# Patient Record
Sex: FEMALE | Race: WHITE | Employment: FULL TIME | URBAN - METROPOLITAN AREA
[De-identification: names, ages, dates, MRNs, and addresses within clinical notes are randomized per-mention and may not be internally consistent; named-entity substitution may affect disease eponyms.]

---

## 2017-01-13 ENCOUNTER — OFFICE VISIT (OUTPATIENT)
Dept: FAMILY MEDICINE CLINIC | Facility: CLINIC | Age: 42
End: 2017-01-13

## 2017-01-13 VITALS
SYSTOLIC BLOOD PRESSURE: 138 MMHG | DIASTOLIC BLOOD PRESSURE: 84 MMHG | BODY MASS INDEX: 38.31 KG/M2 | HEART RATE: 74 BPM | HEIGHT: 68 IN | WEIGHT: 252.81 LBS | RESPIRATION RATE: 14 BRPM

## 2017-01-13 DIAGNOSIS — E66.01 SEVERE OBESITY (BMI 35.0-39.9): ICD-10-CM

## 2017-01-13 DIAGNOSIS — R94.31 ABNORMAL EKG: Primary | ICD-10-CM

## 2017-01-13 DIAGNOSIS — Z01.89 ENCOUNTER FOR ROUTINE LABORATORY TESTING: ICD-10-CM

## 2017-01-13 PROCEDURE — 99213 OFFICE O/P EST LOW 20 MIN: CPT | Performed by: PHYSICIAN ASSISTANT

## 2017-01-13 RX ORDER — BUPROPION HYDROCHLORIDE 150 MG/1
150 TABLET ORAL DAILY
Qty: 30 TABLET | Refills: 2 | Status: SHIPPED | OUTPATIENT
Start: 2017-01-13 | End: 2017-09-12

## 2017-01-13 NOTE — PROGRESS NOTES
[facility] Internal Medicine Progress Note    CC:  Patient presents with: Follow - Up: f/u on allergies / dry  skin - better. Wants to discuss weight loss management. Pt declines  flu shot.        HPI:   HPI  Obesity  Pt states over the past Constitutional: She is oriented to person, place, and time and well-developed, well-nourished, and in no distress.    HENT:   Right Ear: External ear normal.   Left Ear: External ear normal.   Nose: Nose normal.   Mouth/Throat: Oropharynx is clear and frankie Patient verbalizes understanding.     Problem List:  Patient Active Problem List:     Back pain     Chronic low back pain     Lumbar disc herniation     Severe obesity (BMI 35.0-39.9) ([address]

## 2017-01-13 NOTE — PATIENT INSTRUCTIONS
Thank you for choosing Samaria La PA-C at [place] [address]  To Do: Ned Quan  1. Pt to get stress echo  2. Pt to start medication as prescribed  3.  Pt to follow-up in 1 month    • Please signup for MY CHART, which is electronic access to yo to improve your quality of life.     Referrals, and Radiology Information:    If your insurance requires a referral to a specialist, please allow 5 business days to process your referral request.    If Nayely Forman PA-C orders a CT or MRI, it may take

## 2017-01-19 ENCOUNTER — TELEPHONE (OUTPATIENT)
Dept: FAMILY MEDICINE CLINIC | Facility: CLINIC | Age: 42
End: 2017-01-19

## 2017-01-19 NOTE — TELEPHONE ENCOUNTER
Lizzie,              Per [facility] this test does not meet medical guidelines and peer to peer has been requested. Physician can complete by calling [phone] option 3 [facility]#[id number]. Please advise if physician will complete.              Thanks,       Ladonna Aguilera

## 2017-01-27 ENCOUNTER — TELEPHONE (OUTPATIENT)
Dept: FAMILY MEDICINE CLINIC | Facility: CLINIC | Age: 42
End: 2017-01-27

## 2017-01-27 DIAGNOSIS — R94.31 ABNORMAL EKG: Primary | ICD-10-CM

## 2017-01-27 NOTE — TELEPHONE ENCOUNTER
Message        --- Message -----          From: Janet Ventura          Sent: 1/18/2017  10:32 AM            To: [facility] Clinical Staff       Subject: Peer to Peer                                               Lizzie,              Per [facility] this test

## 2017-03-29 ENCOUNTER — HOSPITAL ENCOUNTER (OUTPATIENT)
Dept: CV DIAGNOSTICS | Age: 42
Discharge: HOME | End: 2017-03-29
Attending: INTERNAL MEDICINE
Payer: COMMERCIAL

## 2017-03-29 DIAGNOSIS — R94.31 ABNORMAL EKG: ICD-10-CM

## 2017-03-29 PROCEDURE — 93018 CV STRESS TEST I&R ONLY: CPT | Performed by: INTERNAL MEDICINE

## 2017-03-29 PROCEDURE — 93017 CV STRESS TEST TRACING ONLY: CPT

## 2017-03-29 NOTE — PROGRESS NOTES
Quick Note:    Stress test normal- can start weight loss meds- tell her to come back for more weight loss visits  ______

## 2017-04-05 ENCOUNTER — TELEPHONE (OUTPATIENT)
Dept: FAMILY MEDICINE CLINIC | Facility: CLINIC | Age: 42
End: 2017-04-05

## 2017-04-05 RX ORDER — PHENTERMINE HYDROCHLORIDE 37.5 MG/1
37.5 TABLET ORAL
Qty: 30 TABLET | Refills: 0 | Status: SHIPPED | OUTPATIENT
Start: 2017-04-05 | End: 2017-09-12

## 2017-04-05 NOTE — TELEPHONE ENCOUNTER
Notes Recorded by Erika Christopher MD on 3/29/2017 at 12:35 PM  Stress test normal- can start weight loss meds- tell her to come back for more weight loss visits    Per OV notes:   Pt to follow-up in 1 month  If stress echo is normal, ok to start phentermine

## 2017-09-12 NOTE — PATIENT INSTRUCTIONS
Thank you for choosing SHAR Rust at [place] [address]  To Do: Dennie Minerva  1. Can start taking allegra (in the AM) and zytrec (in the PM)  2. Start eucerin under right eye  3. vasoline for scalp   4.  Refill given for wellbutrin     Effect treatment even beyond those discussed today.  All therapies have potential risk of harm or side effects or medication interactions.  It is your duty and for your safety to discuss with the pharmacist and our office with questions, and to notify us and stop

## 2017-09-12 NOTE — PROGRESS NOTES
[facility] Internal Medicine Office Note  Chief Complaint:   Patient presents with:  Medication Follow-Up  Allergies    HPI:   This is a 38year old female coming in for  HPI    Refill on wellbutrin (was originally given for weight loss- w/ phent Cardiovascular: Negative for chest pain. Genitourinary: Negative. Musculoskeletal: Negative. Skin:        Dry skin   Neurological: Negative for dizziness and weakness. Psychiatric/Behavioral: Positive for depressed mood.  Negative for suicidal louisa -     BuPROPion HCl ER, XL, 150 MG Oral Tablet 24 Hr; Take 1 tablet (150 mg total) by mouth daily.   1. Refilled, is not taking for weight loss, but doing well w/ anxiety and depression, no si/hi    Eczema, unspecified type  -     Emollient (EUCERIN INTENSI Patient Active Problem List:     Back pain     Chronic low back pain     Lumbar disc herniation     Severe obesity (BMI 35.0-39.9) ([facility])    OFFICE/OUTPT VISIT,EST,LEVL III  Depression Screening (PHQ-2/PHQ-9): Over the LAST 2 WEEKS

## 2017-09-21 ENCOUNTER — OFFICE VISIT (OUTPATIENT)
Dept: OBGYN CLINIC | Facility: CLINIC | Age: 42
End: 2017-09-21

## 2017-09-21 VITALS
HEART RATE: 88 BPM | BODY MASS INDEX: 37.28 KG/M2 | DIASTOLIC BLOOD PRESSURE: 76 MMHG | WEIGHT: 246 LBS | HEIGHT: 68 IN | SYSTOLIC BLOOD PRESSURE: 122 MMHG

## 2017-09-21 DIAGNOSIS — Z12.4 CERVICAL CANCER SCREENING: Primary | ICD-10-CM

## 2017-09-21 DIAGNOSIS — N76.0 VAGINITIS AND VULVOVAGINITIS: ICD-10-CM

## 2017-09-21 PROCEDURE — 87624 HPV HI-RISK TYP POOLED RSLT: CPT | Performed by: NURSE PRACTITIONER

## 2017-09-21 PROCEDURE — 88175 CYTOPATH C/V AUTO FLUID REDO: CPT | Performed by: NURSE PRACTITIONER

## 2017-09-21 PROCEDURE — 87510 GARDNER VAG DNA DIR PROBE: CPT | Performed by: NURSE PRACTITIONER

## 2017-09-21 PROCEDURE — 99213 OFFICE O/P EST LOW 20 MIN: CPT | Performed by: NURSE PRACTITIONER

## 2017-09-21 PROCEDURE — 87660 TRICHOMONAS VAGIN DIR PROBE: CPT | Performed by: NURSE PRACTITIONER

## 2017-09-21 PROCEDURE — 87480 CANDIDA DNA DIR PROBE: CPT | Performed by: NURSE PRACTITIONER

## 2017-09-21 RX ORDER — FLUCONAZOLE 150 MG/1
150 TABLET ORAL ONCE
Qty: 2 TABLET | Refills: 0 | Status: SHIPPED | OUTPATIENT
Start: 2017-09-21 | End: 2017-09-21

## 2017-09-21 NOTE — PROGRESS NOTES
Gyne note     S: patient is a 38year old yo  here for a 2-3 day history of vaginal odor, irritation, and itching. Patient had similar symptoms a few weeks ago, used RX Clindamycin she had left at home. No treatment for current episode.  Patient feel THINPREP PAP WITH HPV REFLEX REQUEST B; Future    2.  Vaginitis and vulvovaginitis    - VAGINITIS/VAGINOSIS, DNA PROBE; Future  RX Diflucan 150 mg 1 pill PO X 1, repeat 48 hours

## 2017-09-26 LAB — HPV I/H RISK 1 DNA SPEC QL NAA+PROBE: NEGATIVE

## 2017-09-26 RX ORDER — METRONIDAZOLE 500 MG/1
500 TABLET ORAL 2 TIMES DAILY WITH MEALS
Qty: 14 TABLET | Refills: 0 | Status: SHIPPED | OUTPATIENT
Start: 2017-09-26 | End: 2018-01-10 | Stop reason: ALTCHOICE

## 2017-09-26 NOTE — PROGRESS NOTES
Call to patient; no answer. Left detailed message on VM per FYI ok with results and medication information. To call with questions/concerns.

## 2018-01-10 ENCOUNTER — OFFICE VISIT (OUTPATIENT)
Dept: FAMILY MEDICINE CLINIC | Facility: CLINIC | Age: 43
End: 2018-01-10

## 2018-01-10 ENCOUNTER — LAB ENCOUNTER (OUTPATIENT)
Dept: LAB | Age: 43
End: 2018-01-10
Attending: FAMILY MEDICINE
Payer: COMMERCIAL

## 2018-01-10 VITALS
TEMPERATURE: 98 F | SYSTOLIC BLOOD PRESSURE: 134 MMHG | BODY MASS INDEX: 38.34 KG/M2 | RESPIRATION RATE: 16 BRPM | HEIGHT: 68 IN | WEIGHT: 253 LBS | HEART RATE: 75 BPM | DIASTOLIC BLOOD PRESSURE: 80 MMHG

## 2018-01-10 DIAGNOSIS — E66.01 SEVERE OBESITY (BMI 35.0-39.9): ICD-10-CM

## 2018-01-10 DIAGNOSIS — R53.83 FATIGUE, UNSPECIFIED TYPE: ICD-10-CM

## 2018-01-10 DIAGNOSIS — Z00.00 LABORATORY EXAMINATION ORDERED AS PART OF A ROUTINE GENERAL MEDICAL EXAMINATION: ICD-10-CM

## 2018-01-10 DIAGNOSIS — R32 URINARY INCONTINENCE, UNSPECIFIED TYPE: ICD-10-CM

## 2018-01-10 DIAGNOSIS — K21.9 GASTROESOPHAGEAL REFLUX DISEASE WITHOUT ESOPHAGITIS: ICD-10-CM

## 2018-01-10 DIAGNOSIS — I83.893 VARICOSE VEINS OF LEG WITH SWELLING, BILATERAL: ICD-10-CM

## 2018-01-10 DIAGNOSIS — J01.00 ACUTE MAXILLARY SINUSITIS, RECURRENCE NOT SPECIFIED: Primary | ICD-10-CM

## 2018-01-10 LAB
25-HYDROXYVITAMIN D (TOTAL): 13.7 NG/ML (ref 30–100)
ALBUMIN SERPL-MCNC: 3.7 G/DL (ref 3.5–4.8)
ALP LIVER SERPL-CCNC: 84 U/L (ref 37–98)
ALT SERPL-CCNC: 39 U/L (ref 14–54)
ANTI-THYROGLOBULIN: 24 U/ML (ref ?–60)
ANTI-THYROPEROXIDASE: <28 U/ML (ref ?–60)
AST SERPL-CCNC: 20 U/L (ref 15–41)
BASOPHILS # BLD AUTO: 0.03 X10(3) UL (ref 0–0.1)
BASOPHILS NFR BLD AUTO: 0.3 %
BILIRUB SERPL-MCNC: 0.4 MG/DL (ref 0.1–2)
BUN BLD-MCNC: 11 MG/DL (ref 8–20)
CALCIUM BLD-MCNC: 9.1 MG/DL (ref 8.3–10.3)
CHLORIDE: 106 MMOL/L (ref 101–111)
CHOLEST SMN-MCNC: 171 MG/DL (ref ?–200)
CO2: 25 MMOL/L (ref 22–32)
CREAT BLD-MCNC: 0.64 MG/DL (ref 0.55–1.02)
EOSINOPHIL # BLD AUTO: 0.28 X10(3) UL (ref 0–0.3)
EOSINOPHIL NFR BLD AUTO: 2.5 %
ERYTHROCYTE [DISTWIDTH] IN BLOOD BY AUTOMATED COUNT: 12.6 % (ref 11.5–16)
EST. AVERAGE GLUCOSE BLD GHB EST-MCNC: 108 MG/DL (ref 68–126)
FREE T4: 1.1 NG/DL (ref 0.9–1.8)
GLUCOSE BLD-MCNC: 71 MG/DL (ref 70–99)
HAV AB SERPL IA-ACNC: 610 PG/ML (ref 193–986)
HBA1C MFR BLD HPLC: 5.4 % (ref ?–5.7)
HCT VFR BLD AUTO: 45.6 % (ref 34–50)
HDLC SERPL-MCNC: 39 MG/DL (ref 45–?)
HDLC SERPL: 4.38 {RATIO} (ref ?–4.44)
HGB BLD-MCNC: 14.5 G/DL (ref 12–16)
IMMATURE GRANULOCYTE COUNT: 0.05 X10(3) UL (ref 0–1)
IMMATURE GRANULOCYTE RATIO %: 0.4 %
LDLC SERPL CALC-MCNC: 101 MG/DL (ref ?–130)
LYMPHOCYTES # BLD AUTO: 1.82 X10(3) UL (ref 0.9–4)
LYMPHOCYTES NFR BLD AUTO: 15.9 %
M PROTEIN MFR SERPL ELPH: 7.6 G/DL (ref 6.1–8.3)
MCH RBC QN AUTO: 28 PG (ref 27–33.2)
MCHC RBC AUTO-ENTMCNC: 31.8 G/DL (ref 31–37)
MCV RBC AUTO: 88.2 FL (ref 81–100)
MONOCYTES # BLD AUTO: 0.58 X10(3) UL (ref 0.1–0.6)
MONOCYTES NFR BLD AUTO: 5.1 %
NEUTROPHIL ABS PRELIM: 8.66 X10 (3) UL (ref 1.3–6.7)
NEUTROPHILS # BLD AUTO: 8.66 X10(3) UL (ref 1.3–6.7)
NEUTROPHILS NFR BLD AUTO: 75.8 %
NONHDLC SERPL-MCNC: 132 MG/DL (ref ?–130)
PLATELET # BLD AUTO: 284 10(3)UL (ref 150–450)
POTASSIUM SERPL-SCNC: 3.8 MMOL/L (ref 3.6–5.1)
RBC # BLD AUTO: 5.17 X10(6)UL (ref 3.8–5.1)
RED CELL DISTRIBUTION WIDTH-SD: 40.7 FL (ref 35.1–46.3)
SODIUM SERPL-SCNC: 140 MMOL/L (ref 136–144)
TRIGL SERPL-MCNC: 157 MG/DL (ref ?–150)
TSI SER-ACNC: 2.53 MIU/ML (ref 0.35–5.5)
VLDLC SERPL CALC-MCNC: 31 MG/DL (ref 5–40)
WBC # BLD AUTO: 11.4 X10(3) UL (ref 4–13)

## 2018-01-10 PROCEDURE — 86800 THYROGLOBULIN ANTIBODY: CPT | Performed by: PHYSICIAN ASSISTANT

## 2018-01-10 PROCEDURE — 86376 MICROSOMAL ANTIBODY EACH: CPT | Performed by: PHYSICIAN ASSISTANT

## 2018-01-10 PROCEDURE — 83036 HEMOGLOBIN GLYCOSYLATED A1C: CPT | Performed by: PHYSICIAN ASSISTANT

## 2018-01-10 PROCEDURE — 80061 LIPID PANEL: CPT | Performed by: PHYSICIAN ASSISTANT

## 2018-01-10 PROCEDURE — 82306 VITAMIN D 25 HYDROXY: CPT | Performed by: PHYSICIAN ASSISTANT

## 2018-01-10 PROCEDURE — 80050 GENERAL HEALTH PANEL: CPT | Performed by: PHYSICIAN ASSISTANT

## 2018-01-10 PROCEDURE — 82607 VITAMIN B-12: CPT | Performed by: PHYSICIAN ASSISTANT

## 2018-01-10 PROCEDURE — 99214 OFFICE O/P EST MOD 30 MIN: CPT | Performed by: PHYSICIAN ASSISTANT

## 2018-01-10 PROCEDURE — 36415 COLL VENOUS BLD VENIPUNCTURE: CPT | Performed by: PHYSICIAN ASSISTANT

## 2018-01-10 PROCEDURE — 84439 ASSAY OF FREE THYROXINE: CPT | Performed by: PHYSICIAN ASSISTANT

## 2018-01-10 RX ORDER — METHYLPREDNISOLONE 4 MG/1
TABLET ORAL
Qty: 1 KIT | Refills: 0 | Status: SHIPPED | OUTPATIENT
Start: 2018-01-10 | End: 2019-06-04

## 2018-01-10 RX ORDER — IPRATROPIUM BROMIDE 21 UG/1
2 SPRAY, METERED NASAL EVERY 12 HOURS
Qty: 1 BOTTLE | Refills: 0 | Status: SHIPPED | OUTPATIENT
Start: 2018-01-10 | End: 2019-06-04

## 2018-01-10 RX ORDER — AMOXICILLIN AND CLAVULANATE POTASSIUM 875; 125 MG/1; MG/1
1 TABLET, FILM COATED ORAL 2 TIMES DAILY
Qty: 20 TABLET | Refills: 0 | Status: SHIPPED | OUTPATIENT
Start: 2018-01-10 | End: 2018-01-20

## 2018-01-10 NOTE — PROGRESS NOTES
[facility] Internal Medicine Progress Note    CC:  Patient presents with:  Cough: x 4 days  Nasal Congestion      HPI:   HPI  Cough, nasal congestion x 4 days  Pt had low grade fever over the weekend and raspy cough  She has been taking ibuprofen lb   BMI 38.47 kg/m²  Body mass index is 38.47 kg/m². Physical Exam   Constitutional: She is oriented to person, place, and time and well-developed, well-nourished, and in no distress.    HENT:   Mouth/Throat: Oropharynx is clear and moist. No oropharyngea Refills for this Visit:  Signed Prescriptions Disp Refills    Amoxicillin-Pot Clavulanate 875-125 MG Oral Tab 20 tablet 0      Sig: Take 1 tablet by mouth 2 (two) times daily.       methylPREDNISolone (MEDROL) 4 MG Oral Tablet Therapy Pack 1 kit 0      Sig:

## 2018-01-10 NOTE — PATIENT INSTRUCTIONS
Thank you for choosing Bhavin Jimenez PA-C at [place] [address]  To Do: Cat Garcia  1. Pt to get lab work done  2. Referrals for specialist  3. Pt to get imaging done  4. Begin medications as prescribed  5.  Follow-up in 6 months, sooner if proble of treatment even beyond those discussed today.  All therapies have potential risk of harm or side effects or medication interactions.  It is your duty and for your safety to discuss with the pharmacist and our office with questions, and to notify us and s

## 2018-01-15 NOTE — PROGRESS NOTES
Cholesterol is a little elevated, work on diet and exercise  Low Vit D, begin 50,000 U weekly for 12 weeks, and then OTC 2,000 U daily  Neutrophil count elevated possibly due to recent infection, lets recheck CBC in 1 month  Other lab work is stable  F/U a

## 2018-01-16 DIAGNOSIS — D72.9 NEUTROPHILIA: Primary | ICD-10-CM

## 2018-01-16 RX ORDER — ERGOCALCIFEROL 1.25 MG/1
50000 CAPSULE ORAL WEEKLY
Qty: 12 CAPSULE | Refills: 0 | Status: SHIPPED | OUTPATIENT
Start: 2018-01-16 | End: 2018-03-07

## 2018-01-17 ENCOUNTER — TELEPHONE (OUTPATIENT)
Dept: SURGERY | Facility: CLINIC | Age: 43
End: 2018-01-17

## 2018-01-17 NOTE — TELEPHONE ENCOUNTER
Left message regarding incoming referral for bariatric surgery consultation.  If patient wishes to proceed, please fax insurance and ID to Mitra at [facility] [phone] for insurance verification

## 2018-02-16 ENCOUNTER — TELEPHONE (OUTPATIENT)
Dept: SURGERY | Facility: CLINIC | Age: 43
End: 2018-02-16

## 2018-02-16 NOTE — TELEPHONE ENCOUNTER
Left message reminder regarding incoming referral for bariatric surgery consultation.  If patient wishes to proceed and not already done so, please fax insurance and ID to Mitra at [facility] [phone]

## 2019-05-20 ENCOUNTER — PATIENT MESSAGE (OUTPATIENT)
Dept: OBGYN CLINIC | Facility: CLINIC | Age: 44
End: 2019-05-20

## 2019-05-20 RX ORDER — METRONIDAZOLE 7.5 MG/G
1 GEL VAGINAL NIGHTLY
Qty: 70 G | Refills: 0 | Status: SHIPPED | OUTPATIENT
Start: 2019-05-20 | End: 2019-05-25

## 2019-05-20 NOTE — TELEPHONE ENCOUNTER
From: Jhonny Harris  To: SHAR Ruth  Sent: 5/20/2019 6:25 AM CDT  Subject: Prescription Question    Leanna Wallace,  I have an appointment scheduled w you for June 4th. I'm having once again PH balance issues so I think. This happens a few times a year.

## 2019-06-04 ENCOUNTER — OFFICE VISIT (OUTPATIENT)
Dept: OBGYN CLINIC | Facility: CLINIC | Age: 44
End: 2019-06-04
Payer: COMMERCIAL

## 2019-06-04 VITALS
HEART RATE: 87 BPM | DIASTOLIC BLOOD PRESSURE: 90 MMHG | BODY MASS INDEX: 37.74 KG/M2 | SYSTOLIC BLOOD PRESSURE: 136 MMHG | HEIGHT: 68 IN | WEIGHT: 249 LBS

## 2019-06-04 DIAGNOSIS — Z12.39 BREAST CANCER SCREENING: ICD-10-CM

## 2019-06-04 DIAGNOSIS — Z01.419 WELL WOMAN EXAM WITH ROUTINE GYNECOLOGICAL EXAM: Primary | ICD-10-CM

## 2019-06-04 DIAGNOSIS — Z12.4 CERVICAL CANCER SCREENING: ICD-10-CM

## 2019-06-04 DIAGNOSIS — N76.0 VAGINITIS AND VULVOVAGINITIS: ICD-10-CM

## 2019-06-04 PROCEDURE — 88175 CYTOPATH C/V AUTO FLUID REDO: CPT | Performed by: NURSE PRACTITIONER

## 2019-06-04 PROCEDURE — 87624 HPV HI-RISK TYP POOLED RSLT: CPT | Performed by: NURSE PRACTITIONER

## 2019-06-04 PROCEDURE — 87510 GARDNER VAG DNA DIR PROBE: CPT | Performed by: NURSE PRACTITIONER

## 2019-06-04 PROCEDURE — 87480 CANDIDA DNA DIR PROBE: CPT | Performed by: NURSE PRACTITIONER

## 2019-06-04 PROCEDURE — 87660 TRICHOMONAS VAGIN DIR PROBE: CPT | Performed by: NURSE PRACTITIONER

## 2019-06-04 PROCEDURE — 99396 PREV VISIT EST AGE 40-64: CPT | Performed by: NURSE PRACTITIONER

## 2019-06-04 NOTE — PROGRESS NOTES
Here for Routine Annual Exam  Menses are absent with Mirena, she thinks next June is when it is due to be exchanged.   She believes she may have some type of vaginal infection again, she notes itching and external irritation  No C/O    ROS: No Cardiac,

## 2019-06-05 ENCOUNTER — TELEPHONE (OUTPATIENT)
Dept: OBGYN CLINIC | Facility: CLINIC | Age: 44
End: 2019-06-05

## 2019-06-06 ENCOUNTER — TELEPHONE (OUTPATIENT)
Dept: OBGYN CLINIC | Facility: CLINIC | Age: 44
End: 2019-06-06

## 2019-06-06 NOTE — PROGRESS NOTES
Patient informed of results. Verbalized understanding. No further questions or concerns. All precautions given.

## 2019-06-06 NOTE — TELEPHONE ENCOUNTER
Pharmacy did not receive script for Triamcinolone Cream 0.1%. Script called into pharmacy. Verbalized understanding. No further questions or concerns.

## 2019-07-15 ENCOUNTER — OFFICE VISIT (OUTPATIENT)
Dept: FAMILY MEDICINE CLINIC | Facility: CLINIC | Age: 44
End: 2019-07-15
Payer: COMMERCIAL

## 2019-07-15 VITALS
DIASTOLIC BLOOD PRESSURE: 98 MMHG | SYSTOLIC BLOOD PRESSURE: 154 MMHG | HEIGHT: 68 IN | BODY MASS INDEX: 37.28 KG/M2 | TEMPERATURE: 98 F | RESPIRATION RATE: 16 BRPM | HEART RATE: 72 BPM | WEIGHT: 246 LBS

## 2019-07-15 DIAGNOSIS — R53.83 FATIGUE, UNSPECIFIED TYPE: ICD-10-CM

## 2019-07-15 DIAGNOSIS — F41.9 ANXIETY: ICD-10-CM

## 2019-07-15 DIAGNOSIS — R03.0 ELEVATED BP WITHOUT DIAGNOSIS OF HYPERTENSION: ICD-10-CM

## 2019-07-15 DIAGNOSIS — E55.9 VITAMIN D DEFICIENCY: ICD-10-CM

## 2019-07-15 DIAGNOSIS — R51.9 NEW ONSET HEADACHE: Primary | ICD-10-CM

## 2019-07-15 PROCEDURE — 99214 OFFICE O/P EST MOD 30 MIN: CPT | Performed by: PHYSICIAN ASSISTANT

## 2019-07-15 RX ORDER — FLUOXETINE 10 MG/1
10 CAPSULE ORAL DAILY
Qty: 90 CAPSULE | Refills: 0 | Status: SHIPPED | OUTPATIENT
Start: 2019-07-15 | End: 2019-10-22

## 2019-07-15 NOTE — PROGRESS NOTES
[facility] Internal Medicine Progress Note    CC:  Patient presents with:  Headache: comes and goes a week at a time. Neck Pain: pinched nerve on the right side. Has been see chiro.   Ear Pain: left ear      HPI:   HPI  Pt is complaining of heada disturbance and suicidal ideas. The patient is nervous/anxious. BP (!) 154/98   Pulse 72   Temp 98.4 °F (36.9 °C) (Oral)   Resp 16   Ht 68\"   Wt 246 lb   LMP 07/08/2019 (Approximate)   BMI 37.40 kg/m²  Body mass index is 37.4 kg/m².   Physical Exa effects of medication    Fatigue, unspecified type  Will check labs    Vitamin d deficiency  Pt not taking any supplement  Will check labs    RTC in 1 month, sooner if problems    Orders Placed This Encounter      CBC W Differential W Platelet [E]      Com

## 2019-07-15 NOTE — PATIENT INSTRUCTIONS
Thank you for choosing Felicity Blackburn PA-C at [place] [address]  To Do: Dennie Minerva  1. Start over-the-counter Flonase, over-the-counter Allegra or Zyrtec  2. Patient to begin Prozac  3. Get lab work done  4. Referral for MRI of brain  5.   Con Lakisha Jacobson PA-C orders a CT or MRI, it may take up to 10 business days to receive approval from your insurance company.      Once our office has called informing you that the insurance company approved your testing, please call Central Scheduling at [phone]

## 2019-07-16 LAB
ABSOLUTE BASOPHILS: 36 CELLS/UL (ref 0–200)
ABSOLUTE EOSINOPHILS: 207 CELLS/UL (ref 15–500)
ABSOLUTE LYMPHOCYTES: 1602 CELLS/UL (ref 850–3900)
ABSOLUTE MONOCYTES: 621 CELLS/UL (ref 200–950)
ABSOLUTE NEUTROPHILS: 6534 CELLS/UL (ref 1500–7800)
ALBUMIN/GLOBULIN RATIO: 1.5 (CALC) (ref 1–2.5)
ALBUMIN: 4.2 G/DL (ref 3.6–5.1)
ALKALINE PHOSPHATASE: 85 U/L (ref 33–115)
ALT: 18 U/L (ref 6–29)
AST: 14 U/L (ref 10–30)
BASOPHILS: 0.4 %
BILIRUBIN, TOTAL: 0.3 MG/DL (ref 0.2–1.2)
BUN: 11 MG/DL (ref 7–25)
CALCIUM: 9.5 MG/DL (ref 8.6–10.2)
CARBON DIOXIDE: 26 MMOL/L (ref 20–32)
CHLORIDE: 103 MMOL/L (ref 98–110)
CHOL/HDLC RATIO: 4.7 (CALC)
CHOLESTEROL, TOTAL: 183 MG/DL
CREATININE: 0.7 MG/DL (ref 0.5–1.1)
EGFR IF AFRICN AM: 124 ML/MIN/1.73M2
EGFR IF NONAFRICN AM: 107 ML/MIN/1.73M2
EOSINOPHILS: 2.3 %
GLOBULIN: 2.8 G/DL (CALC) (ref 1.9–3.7)
GLUCOSE: 87 MG/DL (ref 65–99)
HDL CHOLESTEROL: 39 MG/DL
HEMATOCRIT: 42.5 % (ref 35–45)
HEMOGLOBIN A1C: 5.2 % OF TOTAL HGB
HEMOGLOBIN: 14 G/DL (ref 11.7–15.5)
LDL-CHOLESTEROL: 113 MG/DL (CALC)
LYMPHOCYTES: 17.8 %
MCH: 27.8 PG (ref 27–33)
MCHC: 32.9 G/DL (ref 32–36)
MCV: 84.3 FL (ref 80–100)
MONOCYTES: 6.9 %
MPV: 11.3 FL (ref 7.5–12.5)
NEUTROPHILS: 72.6 %
NON-HDL CHOLESTEROL: 144 MG/DL (CALC)
PLATELET COUNT: 308 THOUSAND/UL (ref 140–400)
POTASSIUM: 4.4 MMOL/L (ref 3.5–5.3)
PROTEIN, TOTAL: 7 G/DL (ref 6.1–8.1)
RDW: 12.3 % (ref 11–15)
RED BLOOD CELL COUNT: 5.04 MILLION/UL (ref 3.8–5.1)
SODIUM: 137 MMOL/L (ref 135–146)
T4, FREE: 0.9 NG/DL (ref 0.8–1.8)
TRIGLYCERIDES: 191 MG/DL
TSH: 2.72 MIU/L
VITAMIN B12: 469 PG/ML (ref 200–1100)
VITAMIN D, 25-OH, TOTAL: 24 NG/ML (ref 30–100)
WHITE BLOOD CELL COUNT: 9 THOUSAND/UL (ref 3.8–10.8)

## 2019-07-22 ENCOUNTER — TELEPHONE (OUTPATIENT)
Dept: FAMILY MEDICINE CLINIC | Facility: CLINIC | Age: 44
End: 2019-07-22

## 2019-07-22 RX ORDER — ERGOCALCIFEROL 1.25 MG/1
50000 CAPSULE ORAL WEEKLY
Qty: 12 CAPSULE | Refills: 0 | Status: SHIPPED | OUTPATIENT
Start: 2019-07-22 | End: 2019-08-21

## 2019-07-22 NOTE — TELEPHONE ENCOUNTER
Denial   Received: Today   Message Contents   Spero Primer [facility] Clinical Staff             Good Morning,     Per Tammy Carlson this test does not meet medical necessity and has been denied.  Per their guidelines:     Based on [facility] [place]

## 2019-07-22 NOTE — PROGRESS NOTES
Cholesterol is a little elevated, work on low carb diet, increase physical activity  Low Vit D, begin 50,000 IU weekly x 12 weeks, and then OTC 2,000 IU daily  Follow-up as directed

## 2019-10-11 RX ORDER — ERGOCALCIFEROL 1.25 MG/1
CAPSULE ORAL
Qty: 12 CAPSULE | Refills: 0 | OUTPATIENT
Start: 2019-10-11

## 2019-10-22 NOTE — TELEPHONE ENCOUNTER
Requested Prescriptions     Pending Prescriptions Disp Refills   • FLUOXETINE HCL 10 MG Oral Cap [Pharmacy Med Name: FLUOXETINE HCL 10 MG CAPSULE] 90 capsule 0     Sig: TAKE 1 CAPSULE BY MOUTH EVERY DAY     Non protocol medication    LOV: 7/15/2019  RTC: 1

## 2019-10-24 RX ORDER — FLUOXETINE 10 MG/1
CAPSULE ORAL
Qty: 30 CAPSULE | Refills: 0 | Status: SHIPPED | OUTPATIENT
Start: 2019-10-24 | End: 2019-11-15

## 2019-11-11 RX ORDER — ERGOCALCIFEROL 1.25 MG/1
CAPSULE ORAL
Qty: 12 CAPSULE | Refills: 0 | OUTPATIENT
Start: 2019-11-11

## 2019-11-15 RX ORDER — FLUOXETINE 10 MG/1
CAPSULE ORAL
Qty: 90 CAPSULE | Refills: 0 | Status: SHIPPED | OUTPATIENT
Start: 2019-11-15 | End: 2021-04-28

## 2019-12-10 RX ORDER — ERGOCALCIFEROL 1.25 MG/1
CAPSULE ORAL
Qty: 12 CAPSULE | Refills: 0 | OUTPATIENT
Start: 2019-12-10

## 2019-12-23 ENCOUNTER — OFFICE VISIT (OUTPATIENT)
Dept: FAMILY MEDICINE CLINIC | Facility: CLINIC | Age: 44
End: 2019-12-23
Payer: COMMERCIAL

## 2019-12-23 ENCOUNTER — HOSPITAL ENCOUNTER (OUTPATIENT)
Dept: MAMMOGRAPHY | Age: 44
Discharge: HOME | End: 2019-12-23
Attending: NURSE PRACTITIONER
Payer: COMMERCIAL

## 2019-12-23 ENCOUNTER — HOSPITAL ENCOUNTER (OUTPATIENT)
Dept: MRI IMAGING | Age: 44
Discharge: HOME | End: 2019-12-23
Attending: PHYSICIAN ASSISTANT
Payer: COMMERCIAL

## 2019-12-23 VITALS
DIASTOLIC BLOOD PRESSURE: 90 MMHG | BODY MASS INDEX: 38.34 KG/M2 | HEIGHT: 68 IN | WEIGHT: 253 LBS | TEMPERATURE: 99 F | HEART RATE: 98 BPM | SYSTOLIC BLOOD PRESSURE: 140 MMHG | OXYGEN SATURATION: 95 % | RESPIRATION RATE: 18 BRPM

## 2019-12-23 DIAGNOSIS — K21.9 GASTROESOPHAGEAL REFLUX DISEASE, ESOPHAGITIS PRESENCE NOT SPECIFIED: ICD-10-CM

## 2019-12-23 DIAGNOSIS — R51.9 NEW ONSET HEADACHE: ICD-10-CM

## 2019-12-23 DIAGNOSIS — I10 ESSENTIAL HYPERTENSION: Primary | ICD-10-CM

## 2019-12-23 DIAGNOSIS — E66.01 SEVERE OBESITY WITH BODY MASS INDEX (BMI) OF 35.0 TO 39.9 WITH SERIOUS COMORBIDITY (HCC): ICD-10-CM

## 2019-12-23 DIAGNOSIS — Z12.39 BREAST CANCER SCREENING: ICD-10-CM

## 2019-12-23 PROCEDURE — 70553 MRI BRAIN STEM W/O & W/DYE: CPT | Performed by: PHYSICIAN ASSISTANT

## 2019-12-23 PROCEDURE — A9575 INJ GADOTERATE MEGLUMI 0.1ML: HCPCS

## 2019-12-23 PROCEDURE — 77067 SCR MAMMO BI INCL CAD: CPT | Performed by: NURSE PRACTITIONER

## 2019-12-23 PROCEDURE — 99214 OFFICE O/P EST MOD 30 MIN: CPT | Performed by: PHYSICIAN ASSISTANT

## 2019-12-23 PROCEDURE — 77063 BREAST TOMOSYNTHESIS BI: CPT | Performed by: NURSE PRACTITIONER

## 2019-12-23 RX ORDER — LISINOPRIL 10 MG/1
10 TABLET ORAL DAILY
Qty: 90 TABLET | Refills: 0 | Status: SHIPPED | OUTPATIENT
Start: 2019-12-23 | End: 2020-04-20

## 2019-12-23 RX ORDER — NICOTINE POLACRILEX 4 MG/1
1 GUM, CHEWING ORAL DAILY
Qty: 90 TABLET | Refills: 1 | Status: SHIPPED | OUTPATIENT
Start: 2019-12-23 | End: 2020-01-22

## 2019-12-23 NOTE — PROGRESS NOTES
[facility] Internal Medicine Progress Note    CC:  Patient presents with:  Blood Pressure: BP check  Imm/Inj: declined flu shot      HPI:   HPI  Pt is here to follow-up on her blood pressure  She states work has been stressful  Her blood pressure distress. HENT:   Mouth/Throat: Oropharynx is clear and moist. No oropharyngeal exudate. Eyes: Pupils are equal, round, and reactive to light. EOM are normal.   Neck: No thyromegaly present.    Cardiovascular: Normal rate, regular rhythm and normal hear Back pain     Chronic low back pain     Lumbar disc herniation     Severe obesity with body mass index (BMI) of 35.0 to 39.9 with serious comorbidity ([address]

## 2019-12-23 NOTE — PATIENT INSTRUCTIONS
Thank you for choosing Pancho Miller PA-C at [place] [address]  To Do: Jorge Kirby  1. Pt to begin medications as prescribed  2. Monitor BP at home  3.  Follow-up in 1 month, sooner if problems    • Please signup for MY CHART, which is electronic approved your testing, please call Central Scheduling at [phone]  Please allow our office 5 business days to contact you regarding any testing results.     Refill policies:   Allow 3 business days for refills; controlled substances may take longer and surfaces  · Replace your running shoes often  · Ease up on your training  · On a track, make sure you run in both directions  · Have an expert check your stance for running and other sporting activities  · Stretch your outer thigh and hamstrings often  If

## 2019-12-24 NOTE — PROGRESS NOTES
Minimal abnormalities in the white matter, can be seen with migraines which is likely the cause. However may be seen with chronic small vessel disease or demyelinating process which is more unlikely.   Let's consult neuro just to make sure it is from [place]

## 2020-01-08 ENCOUNTER — PATIENT MESSAGE (OUTPATIENT)
Dept: FAMILY MEDICINE CLINIC | Facility: CLINIC | Age: 45
End: 2020-01-08

## 2020-01-08 NOTE — TELEPHONE ENCOUNTER
From: Ed Brush  To: Fiorella Galicia PA-C  Sent: 1/8/2020 9:50 AM CST  Subject: Test Results Question    I received a call regarding my MRI results.  She advised me they still wanted me to see a neurologist and she would send me his/her name and numb

## 2020-01-08 NOTE — TELEPHONE ENCOUNTER
Information below provided to patient via mychart  Dr. Radha Sahni  Address: [id number] [place]  Phone: [phone]

## 2020-04-18 ENCOUNTER — TELEPHONE (OUTPATIENT)
Dept: FAMILY MEDICINE CLINIC | Facility: CLINIC | Age: 45
End: 2020-04-18

## 2020-04-20 RX ORDER — LISINOPRIL 10 MG/1
TABLET ORAL
Qty: 30 TABLET | Refills: 0 | Status: SHIPPED | OUTPATIENT
Start: 2020-04-20 | End: 2020-05-04

## 2020-04-20 NOTE — TELEPHONE ENCOUNTER
Future Appointments   Date Time Provider Víctor Dana   4/21/2020 11:30 AM Rowdy Del Rosario PA-C [facility] [address]   7/22/2020 12:30 PM SHAR Garza [facility] [address]

## 2020-04-20 NOTE — TELEPHONE ENCOUNTER
Requested Prescriptions     Pending Prescriptions Disp Refills   • LISINOPRIL 10 MG Oral Tab [Pharmacy Med Name: LISINOPRIL 10 MG TABLET] 30 tablet 2     Sig: TAKE 1 TABLET BY MOUTH EVERY DAY     LOV: 12/23/19  RTC: 4 weeks  Last Relevant Labs: 7/15/19  Fi

## 2020-04-21 ENCOUNTER — VIRTUAL PHONE E/M (OUTPATIENT)
Dept: FAMILY MEDICINE CLINIC | Facility: CLINIC | Age: 45
End: 2020-04-21
Payer: COMMERCIAL

## 2020-04-21 DIAGNOSIS — I10 ESSENTIAL HYPERTENSION: Primary | ICD-10-CM

## 2020-04-21 DIAGNOSIS — E66.01 SEVERE OBESITY WITH BODY MASS INDEX (BMI) OF 35.0 TO 39.9 WITH SERIOUS COMORBIDITY (HCC): ICD-10-CM

## 2020-04-21 PROCEDURE — 99213 OFFICE O/P EST LOW 20 MIN: CPT | Performed by: PHYSICIAN ASSISTANT

## 2020-04-21 NOTE — PROGRESS NOTES
Virtual Telephone Check-In    Timmy Marino verbally consents to a Virtual/Telephone Check-In visit on 04/21/20. Patient understands and accepts financial responsibility for any deductible, co-insurance and/or co-pays associated with this service.     D this visit. There is no height or weight on file to calculate BMI.   Physical Exam  GENERAL: A&Ox3  NEURO: Speaking fluently and in clear sentences  RESPIRATORY: No increased effort or work with breathing    Assessment and Plan:  Essential hypertension  (pr

## 2020-04-21 NOTE — PATIENT INSTRUCTIONS
Thank you for choosing Rani Red PA-C at [place] [address]  To Do: Shay Gramajo  1. Purchase blood pressure cuff and monitor blood pressure  2. Continue exercise and low carb diet  3.  Follow-up around 3 months, sooner if problems    • Please s informing you that the insurance company approved your testing, please call Central Scheduling at [phone]  Please allow our office 5 business days to contact you regarding any testing results.     Refill policies:   Allow 3 business days for refills; c

## 2020-05-04 RX ORDER — LISINOPRIL 10 MG/1
10 TABLET ORAL DAILY
Qty: 90 TABLET | Refills: 1 | Status: SHIPPED | OUTPATIENT
Start: 2020-05-04 | End: 2021-03-03 | Stop reason: SINTOL

## 2020-05-04 NOTE — TELEPHONE ENCOUNTER
Hypertension Medications Protocol Passed5/4 2:31 PM   CMP or BMP in past 12 months    Last serum creatinine< 2.0    Appointment in past 6 or next 3 months     Requesting lisinopril 10 MG   LOV: 4/21/20 (tele visit)  RTC:   Last Relevant Labs: 7/15/19  Kathi

## 2020-07-22 ENCOUNTER — OFFICE VISIT (OUTPATIENT)
Dept: OBGYN CLINIC | Facility: CLINIC | Age: 45
End: 2020-07-22
Payer: COMMERCIAL

## 2020-07-22 VITALS
WEIGHT: 247.38 LBS | BODY MASS INDEX: 37.49 KG/M2 | HEIGHT: 68 IN | TEMPERATURE: 98 F | SYSTOLIC BLOOD PRESSURE: 124 MMHG | DIASTOLIC BLOOD PRESSURE: 76 MMHG

## 2020-07-22 DIAGNOSIS — Z30.433 ENCOUNTER FOR REMOVAL AND REINSERTION OF IUD: Primary | ICD-10-CM

## 2020-07-22 PROCEDURE — 58301 REMOVE INTRAUTERINE DEVICE: CPT | Performed by: NURSE PRACTITIONER

## 2020-07-22 PROCEDURE — 3074F SYST BP LT 130 MM HG: CPT | Performed by: NURSE PRACTITIONER

## 2020-07-22 PROCEDURE — 3008F BODY MASS INDEX DOCD: CPT | Performed by: NURSE PRACTITIONER

## 2020-07-22 PROCEDURE — 58300 INSERT INTRAUTERINE DEVICE: CPT | Performed by: NURSE PRACTITIONER

## 2020-07-22 PROCEDURE — 3078F DIAST BP <80 MM HG: CPT | Performed by: NURSE PRACTITIONER

## 2020-07-22 RX ORDER — OMEPRAZOLE 20 MG/1
20 CAPSULE, DELAYED RELEASE ORAL DAILY
COMMUNITY
Start: 2020-02-23

## 2020-07-22 NOTE — PROGRESS NOTES
Procedure:    S:  The patient was consented for removal of current Mirena IUD and replacement of new Mirena device. Risks and benefits were discussed including infection, uterine perforation, uterine expulsion, PID, ectopic and intrauterine pregnancy.  All

## 2020-08-19 ENCOUNTER — OFFICE VISIT (OUTPATIENT)
Dept: OBGYN CLINIC | Facility: CLINIC | Age: 45
End: 2020-08-19
Payer: COMMERCIAL

## 2020-08-19 ENCOUNTER — OFFICE VISIT (OUTPATIENT)
Dept: FAMILY MEDICINE CLINIC | Facility: CLINIC | Age: 45
End: 2020-08-19
Payer: COMMERCIAL

## 2020-08-19 VITALS
RESPIRATION RATE: 16 BRPM | HEIGHT: 68 IN | WEIGHT: 239 LBS | HEART RATE: 98 BPM | TEMPERATURE: 98 F | BODY MASS INDEX: 36.22 KG/M2 | SYSTOLIC BLOOD PRESSURE: 128 MMHG | DIASTOLIC BLOOD PRESSURE: 88 MMHG | OXYGEN SATURATION: 97 %

## 2020-08-19 VITALS
DIASTOLIC BLOOD PRESSURE: 88 MMHG | BODY MASS INDEX: 36 KG/M2 | WEIGHT: 237.81 LBS | SYSTOLIC BLOOD PRESSURE: 126 MMHG | TEMPERATURE: 98 F

## 2020-08-19 DIAGNOSIS — L30.9 ECZEMA, UNSPECIFIED TYPE: ICD-10-CM

## 2020-08-19 DIAGNOSIS — H10.11 ALLERGIC CONJUNCTIVITIS OF RIGHT EYE: Primary | ICD-10-CM

## 2020-08-19 DIAGNOSIS — J30.2 SEASONAL ALLERGIC RHINITIS, UNSPECIFIED TRIGGER: ICD-10-CM

## 2020-08-19 DIAGNOSIS — Z30.431 IUD CHECK UP: Primary | ICD-10-CM

## 2020-08-19 PROCEDURE — 3079F DIAST BP 80-89 MM HG: CPT | Performed by: NURSE PRACTITIONER

## 2020-08-19 PROCEDURE — 3079F DIAST BP 80-89 MM HG: CPT | Performed by: PHYSICIAN ASSISTANT

## 2020-08-19 PROCEDURE — 3074F SYST BP LT 130 MM HG: CPT | Performed by: NURSE PRACTITIONER

## 2020-08-19 PROCEDURE — 99214 OFFICE O/P EST MOD 30 MIN: CPT | Performed by: PHYSICIAN ASSISTANT

## 2020-08-19 PROCEDURE — 3008F BODY MASS INDEX DOCD: CPT | Performed by: PHYSICIAN ASSISTANT

## 2020-08-19 PROCEDURE — 99213 OFFICE O/P EST LOW 20 MIN: CPT | Performed by: NURSE PRACTITIONER

## 2020-08-19 PROCEDURE — 3074F SYST BP LT 130 MM HG: CPT | Performed by: PHYSICIAN ASSISTANT

## 2020-08-19 RX ORDER — MULTIVIT-MIN/IRON FUM/FOLIC AC 7.5 MG-4
1 TABLET ORAL DAILY
COMMUNITY

## 2020-08-19 RX ORDER — METHYLPREDNISOLONE 4 MG/1
TABLET ORAL
Qty: 1 KIT | Refills: 0 | Status: SHIPPED | OUTPATIENT
Start: 2020-08-19 | End: 2021-01-06 | Stop reason: ALTCHOICE

## 2020-08-19 RX ORDER — IPRATROPIUM BROMIDE 21 UG/1
2 SPRAY, METERED NASAL EVERY 12 HOURS
Qty: 1 BOTTLE | Refills: 0 | Status: SHIPPED | OUTPATIENT
Start: 2020-08-19 | End: 2020-09-10

## 2020-08-19 RX ORDER — TRIAMCINOLONE ACETONIDE 0.25 MG/G
1 CREAM TOPICAL 2 TIMES DAILY
Qty: 1 TUBE | Refills: 0 | Status: SHIPPED | OUTPATIENT
Start: 2020-08-19

## 2020-08-19 RX ORDER — OLOPATADINE HYDROCHLORIDE 2 MG/ML
1 SOLUTION/ DROPS OPHTHALMIC DAILY
Qty: 1 BOTTLE | Refills: 0 | Status: SHIPPED | OUTPATIENT
Start: 2020-08-19 | End: 2020-09-24

## 2020-08-19 NOTE — PATIENT INSTRUCTIONS
Thank you for choosing Jer Mcclelland PA-C at [place] [address]  To Do: Brittany Clancy  1. OTC Allegra in AM, OTC Zyrtec in PM  2. Begin other medications as prescribed  3.  Follow-up if symptoms persist or increase    • Please signup for MY CHART, wh insurance company approved your testing, please call Central Scheduling at [phone]  Please allow our office 5 business days to contact you regarding any testing results.     Refill policies:   Allow 3 business days for refills; controlled substances ma

## 2020-08-19 NOTE — PROGRESS NOTES
Gyne note     S: patient is a 39year old yo  here for a follow up after having her Mirena IUD removed and replaced with a new device. She denies any concerns, abnormal pain or bleeding after replacement. She has no concerns or questions.     Review

## 2020-08-19 NOTE — PROGRESS NOTES
[facility] Internal Medicine Progress Note    CC:  Patient presents with: Allergies: itchy watery right eye. Derm Problem: dry patches of skin on right side of face.       HPI:   HPI  Patient is a 57-year-old female here today complaining of rig Respiratory: Negative for cough and shortness of breath. Cardiovascular: Negative for chest pain. Gastrointestinal: Negative for nausea and vomiting. Skin: Positive for rash. Neurological: Negative for dizziness, light-headedness and headaches. side effects and adverse effects of medication    RTC prn    No orders of the defined types were placed in this encounter.       Meds & Refills for this Visit:  Requested Prescriptions     Signed Prescriptions Disp Refills   • Ipratropium Bromide 0.03 % U[place]

## 2020-08-19 NOTE — PATIENT INSTRUCTIONS
[address]  Phone: [phone] [place] [address] [phone]/ [phone]    [facility]    [address] floor, [place]

## 2020-09-10 RX ORDER — IPRATROPIUM BROMIDE 21 UG/1
SPRAY, METERED NASAL
Qty: 1 BOTTLE | Refills: 0 | Status: SHIPPED | OUTPATIENT
Start: 2020-09-10

## 2020-09-10 NOTE — TELEPHONE ENCOUNTER
Name from pharmacy: IPRATROPIUM 0.03% SPRAY          Will file in chart as: IPRATROPIUM BROMIDE 0.03 % Nasal Solution         Sig: USE 2 SPRAYS IN EACH NOSTRIL 2 TIMES DAILY    Disp:  1 Bottle (Pharmacy requested: 30 spray)    Refills:  0    Start: 9/10/20

## 2021-01-06 ENCOUNTER — OFFICE VISIT (OUTPATIENT)
Dept: FAMILY MEDICINE CLINIC | Facility: CLINIC | Age: 46
End: 2021-01-06
Payer: COMMERCIAL

## 2021-01-06 VITALS
HEART RATE: 74 BPM | DIASTOLIC BLOOD PRESSURE: 80 MMHG | TEMPERATURE: 98 F | WEIGHT: 251 LBS | RESPIRATION RATE: 16 BRPM | BODY MASS INDEX: 38.04 KG/M2 | HEIGHT: 68 IN | OXYGEN SATURATION: 98 % | SYSTOLIC BLOOD PRESSURE: 124 MMHG

## 2021-01-06 DIAGNOSIS — R68.89 SENSITIVITY TO THE COLD: ICD-10-CM

## 2021-01-06 DIAGNOSIS — I10 ESSENTIAL HYPERTENSION: ICD-10-CM

## 2021-01-06 DIAGNOSIS — M79.604 PAIN IN BOTH LOWER EXTREMITIES: ICD-10-CM

## 2021-01-06 DIAGNOSIS — Z12.31 ENCOUNTER FOR SCREENING MAMMOGRAM FOR MALIGNANT NEOPLASM OF BREAST: ICD-10-CM

## 2021-01-06 DIAGNOSIS — E66.01 SEVERE OBESITY WITH BODY MASS INDEX (BMI) OF 35.0 TO 39.9 WITH SERIOUS COMORBIDITY (HCC): ICD-10-CM

## 2021-01-06 DIAGNOSIS — R07.9 CHEST PAIN, UNSPECIFIED TYPE: Primary | ICD-10-CM

## 2021-01-06 DIAGNOSIS — M79.605 PAIN IN BOTH LOWER EXTREMITIES: ICD-10-CM

## 2021-01-06 PROCEDURE — 3074F SYST BP LT 130 MM HG: CPT | Performed by: PHYSICIAN ASSISTANT

## 2021-01-06 PROCEDURE — 3079F DIAST BP 80-89 MM HG: CPT | Performed by: PHYSICIAN ASSISTANT

## 2021-01-06 PROCEDURE — 3008F BODY MASS INDEX DOCD: CPT | Performed by: PHYSICIAN ASSISTANT

## 2021-01-06 PROCEDURE — 99214 OFFICE O/P EST MOD 30 MIN: CPT | Performed by: PHYSICIAN ASSISTANT

## 2021-01-06 RX ORDER — NAPROXEN 500 MG/1
500 TABLET ORAL 2 TIMES DAILY WITH MEALS
Qty: 20 TABLET | Refills: 0 | Status: SHIPPED | OUTPATIENT
Start: 2021-01-06 | End: 2021-04-28 | Stop reason: ALTCHOICE

## 2021-01-06 RX ORDER — CLOBETASOL PROPIONATE 0.5 MG/G
CREAM TOPICAL
COMMUNITY
Start: 2020-11-01

## 2021-01-06 RX ORDER — MONTELUKAST SODIUM 10 MG/1
10 TABLET ORAL NIGHTLY
COMMUNITY
Start: 2021-01-01

## 2021-01-06 NOTE — PATIENT INSTRUCTIONS
Thank you for choosing Jai Montez PA-C at [place] [address]  To Do: Hu Zavala  1. Get imaging and lab work done  2. Heat to area  3.  Follow-up around 1 month    • Please signup for MY CHART, which is electronic access to your record if you h call Central Scheduling at [phone]  Please allow our office 5 business days to contact you regarding any testing results.     Refill policies:   Allow 3 business days for refills; controlled substances may take longer and must be picked up from the off

## 2021-01-06 NOTE — PROGRESS NOTES
[facility] Internal Medicine Progress Note    CC:  Patient presents with: Follow - Up: in december she went in to [id number] [place] [address] and a EKG chest xray and covid test was done but all came back clear.  She was having chest discomfort but that has gotten Multiple Vitamins-Minerals (MULTI-VITAMIN/MINERALS) Oral Tab, Take 1 tablet by mouth daily. , Disp: , Rfl:     •  FEXOFENADINE HCL OR, as needed. , Disp: , Rfl:     •  triamcinolone acetonide 0.025 % External Cream, Apply 1 Application topically 2 (two) keven regular rhythm and normal heart sounds. Exam reveals no gallop and no friction rub. No murmur heard. Pulmonary/Chest: Effort normal and breath sounds normal. No respiratory distress. She has no wheezes. She has no rales. She exhibits no tenderness.    Abdias Fontenot Patient/Caregiver Education: There are no barriers to learning. Medical education done. Outcome: Patient verbalizes understanding.     Problem List:  Patient Active Problem List:     Back pain     Chronic low back pain     Lumbar disc herniation     Severe

## 2021-02-08 ENCOUNTER — PATIENT MESSAGE (OUTPATIENT)
Dept: FAMILY MEDICINE CLINIC | Facility: CLINIC | Age: 46
End: 2021-02-08

## 2021-02-08 NOTE — TELEPHONE ENCOUNTER
From: Ismael Moran  To: Sabra Alvares PA-C  Sent: 2/8/2021 10:08 AM CST  Subject: Visit Follow-up Question    My blood pressure was high over the weekend. I took it myself at home. I assume I put the cuff on correctly.  It was 160/107 then I got it do

## 2021-02-22 ENCOUNTER — OFFICE VISIT (OUTPATIENT)
Dept: FAMILY MEDICINE CLINIC | Facility: CLINIC | Age: 46
End: 2021-02-22
Payer: COMMERCIAL

## 2021-02-22 VITALS
BODY MASS INDEX: 37.13 KG/M2 | OXYGEN SATURATION: 98 % | HEIGHT: 68 IN | SYSTOLIC BLOOD PRESSURE: 128 MMHG | RESPIRATION RATE: 16 BRPM | TEMPERATURE: 97 F | DIASTOLIC BLOOD PRESSURE: 90 MMHG | WEIGHT: 245 LBS | HEART RATE: 96 BPM

## 2021-02-22 DIAGNOSIS — I10 ESSENTIAL HYPERTENSION: Primary | ICD-10-CM

## 2021-02-22 DIAGNOSIS — R07.89 CHEST WALL PAIN: ICD-10-CM

## 2021-02-22 PROCEDURE — 3080F DIAST BP >= 90 MM HG: CPT | Performed by: FAMILY MEDICINE

## 2021-02-22 PROCEDURE — 3008F BODY MASS INDEX DOCD: CPT | Performed by: FAMILY MEDICINE

## 2021-02-22 PROCEDURE — 3074F SYST BP LT 130 MM HG: CPT | Performed by: FAMILY MEDICINE

## 2021-02-22 PROCEDURE — 99214 OFFICE O/P EST MOD 30 MIN: CPT | Performed by: FAMILY MEDICINE

## 2021-02-22 RX ORDER — FEXOFENADINE HCL 180 MG/1
180 TABLET ORAL DAILY PRN
COMMUNITY

## 2021-02-22 NOTE — PATIENT INSTRUCTIONS
Thank you for choosing Tina Ugalde MD at [place] [address]  To Do: Rina Ackerman   1. Please hold lisinopril and see if  symptoms reported will resolve or improve. May take Tylenol as needed for chest wall pain.   Please take omeprazole 20 mg saurabh Eye Disease: Hypertension can damage the very small blood vessels in the retina.   Bleeding from the aorta, the large blood vessel that supplies blood to the abdomen, pelvis, and legs   Heart failure   Poor blood supply to the legs  Erectile Dysfunction potassium (4,700 milligrams (mg)/day), calcium (1,250 mg/day), and magnesium (500 mg/day) and much less sodium (salt) than the typical American diet. Limit sodium to no more than 2,300 mg a day (eating only 1,500 mg a day is an even better goal).    Reduce leafy green vegetables or 1/2 cup cut-up raw or cooked vegetables.    • Don't think of vegetables only as side dishes — a hearty blend of vegetables served over brown rice or whole-wheat noodles can serve as the main dish for a meal.   • Fresh or frozen veg products, choose lactose-free products or consider taking an over-the-counter product that contains the enzyme lactase, which can reduce or prevent the symptoms of lactose intolerance.    • Go easy on regular and even fat-free cheeses because they are typic make a complete protein, just like meat. They also contain isoflavones, a type of natural plant compound (phytochemical) that has been shown to have some health benefits.    Fats and oils (2 to 3 servings a day)  Fat helps your body absorb essential vitamin a more nutritious beverage such as low-fat milk or even plain water. • Cut back on added sugar, which has no nutritional value but can pack on calories. DASH diet: Alcohol and caffeine  Drinking too much alcohol can increase blood pressure.  The DASH di considered healthy — often have lots of sodium. You may not notice a difference in taste when you choose low-sodium food and beverages.  If things seem too bland, gradually introduce low-sodium foods and cut back on table salt until you reach your sodium harm or side effects or medication interactions.  It is your duty and for your safety to discuss with the pharmacist and our office with questions, and to notify us and stop treatment if problems arise, but know that our intention is that the benefits outw

## 2021-02-22 NOTE — PROGRESS NOTES
HPI:    Patient ID: Cat Garcia is a 39year old female. HPI  Ms. Rosalind Cardoza is a pleasant 43-year-old female with known history of hypertension and allergies here today to follow-up for ongoing chest wall pain which she had localized on the mid sternum. nightly. • Clobetasol Propionate 0.05 % External Cream APPLY TOPICALLY TO ECZEMA TWICE DAILY     • naproxen 500 MG Oral Tab Take 1 tablet (500 mg total) by mouth 2 (two) times daily with meals.  20 tablet 0   • Olopatadine HCl 0.2 % Ophthalmic Solutio however I had asked her to hold her lisinopril due to the fact that she has had chronic dry cough since last year. Monitor blood pressure at this point. I asked her to give us an update next week on how she is doing.   Chest wall pain  -May take Tylenol a

## 2021-03-03 ENCOUNTER — PATIENT MESSAGE (OUTPATIENT)
Dept: FAMILY MEDICINE CLINIC | Facility: CLINIC | Age: 46
End: 2021-03-03

## 2021-03-03 RX ORDER — HYDROCHLOROTHIAZIDE 25 MG/1
25 TABLET ORAL DAILY
Qty: 30 TABLET | Refills: 0 | Status: SHIPPED | OUTPATIENT
Start: 2021-03-03 | End: 2021-03-25

## 2021-03-03 NOTE — TELEPHONE ENCOUNTER
Please include lisinopril on the side effect profile    Start hydrochlorothiazide 25mg daily. F/U in 4 weeks    Thanks for the update.

## 2021-03-03 NOTE — TELEPHONE ENCOUNTER
From: Jatin Schulz  To: Ross Ramirez MD  Sent: 3/3/2021 11:55 AM CST  Subject: Visit Follow-up Question    Good afternoon Dr Chelsi Laurent    I saw you Monday last week. We decided to discontinue the lisinopril to see if the cough and chest pain subside.  Eileen Regalado

## 2021-03-25 RX ORDER — HYDROCHLOROTHIAZIDE 25 MG/1
TABLET ORAL
Qty: 30 TABLET | Refills: 0 | Status: SHIPPED | OUTPATIENT
Start: 2021-03-25 | End: 2021-04-07

## 2021-04-07 RX ORDER — HYDROCHLOROTHIAZIDE 25 MG/1
25 TABLET ORAL DAILY
Qty: 90 TABLET | Refills: 0 | Status: SHIPPED | OUTPATIENT
Start: 2021-04-07 | End: 2021-04-28

## 2021-04-07 NOTE — TELEPHONE ENCOUNTER
LOV   Essential hypertension  (primary encounter diagnosis)  -Well-controlled; however I had asked her to hold her lisinopril due to the fact that she has had chronic dry cough since last year. Monitor blood pressure at this point.   I asked her to give us

## 2021-04-28 ENCOUNTER — OFFICE VISIT (OUTPATIENT)
Dept: FAMILY MEDICINE CLINIC | Facility: CLINIC | Age: 46
End: 2021-04-28
Payer: COMMERCIAL

## 2021-04-28 VITALS
HEART RATE: 88 BPM | DIASTOLIC BLOOD PRESSURE: 80 MMHG | WEIGHT: 247 LBS | TEMPERATURE: 97 F | RESPIRATION RATE: 16 BRPM | SYSTOLIC BLOOD PRESSURE: 130 MMHG | BODY MASS INDEX: 37.44 KG/M2 | OXYGEN SATURATION: 100 % | HEIGHT: 68 IN

## 2021-04-28 DIAGNOSIS — R07.9 CHEST PAIN, UNSPECIFIED TYPE: ICD-10-CM

## 2021-04-28 DIAGNOSIS — I10 ESSENTIAL HYPERTENSION: Primary | ICD-10-CM

## 2021-04-28 PROCEDURE — 99214 OFFICE O/P EST MOD 30 MIN: CPT | Performed by: FAMILY MEDICINE

## 2021-04-28 PROCEDURE — 3075F SYST BP GE 130 - 139MM HG: CPT | Performed by: FAMILY MEDICINE

## 2021-04-28 PROCEDURE — 3079F DIAST BP 80-89 MM HG: CPT | Performed by: FAMILY MEDICINE

## 2021-04-28 PROCEDURE — 3008F BODY MASS INDEX DOCD: CPT | Performed by: FAMILY MEDICINE

## 2021-04-28 RX ORDER — METOPROLOL SUCCINATE 25 MG/1
25 TABLET, EXTENDED RELEASE ORAL DAILY
Qty: 90 TABLET | Refills: 1 | Status: SHIPPED | OUTPATIENT
Start: 2021-04-28 | End: 2021-10-25

## 2021-04-28 NOTE — PATIENT INSTRUCTIONS
Thank you for choosing Anabel Pena MD at [place] [address]  To Do: Jhonny Miss  1. Please have tests done  [facility] is located in [address]. Monday, Tuesday & Friday – 8 a.m. to 4 p.m. Wednesday, Thursday – 7 a.m. to 3 p.m.   The lab potential risks and we strive to make you healthier and to improve your quality of life.     Referrals, and Radiology Information:    If your insurance requires a referral to a specialist, please allow 5 business days to process your referral request.    If disease  · Anxiety and panic disorders  · Nerve compression and inflammation  · Rare miscellaneous problems such as aortic aneurysm (a swelling of the large artery coming out of the heart) or pulmonary embolism (a blood clot in the lungs)  Home care  After

## 2021-04-28 NOTE — PROGRESS NOTES
HPI/Subjective:   Patient ID: Jhonny Harris is a 39year old female. HPI  Ms. Henna Callahan is a pleasant 71-year-old female with history of hypertension, allergies, chronic chest pain here today for follow-up.   I did start her on hydrochlorothiazide to help l 0.03 % Nasal Solution USE 2 SPRAYS IN EACH NOSTRIL 2 TIMES DAILY 1 Bottle 0   • Multiple Vitamins-Minerals (MULTI-VITAMIN/MINERALS) Oral Tab Take 1 tablet by mouth daily.      • triamcinolone acetonide 0.025 % External Cream Apply 1 Application topically 2 Assessment & Plan:   Essential hypertension  (primary encounter diagnosis)  -Stable; will plan trial of metoprolol 25 mg extended release daily; I did ask her to monitor blood pressure and record them for me which are reviewed after 4 weeks when I

## 2021-05-03 NOTE — PROGRESS NOTES
Cholesterol is a little elevated, continue to work on lifestyle changes, low carb diet, increase exercise  Low Vit D, begin 50,000IU weekly x 12 weeks, and then OTC 2,000IU daily, take with food  Iron levels are elevated, is pt taking iron supplement

## 2021-05-05 NOTE — PROGRESS NOTES
Patient was originally scheduled for a dobutamine stress echo from Dr. Nery Crenshaw via epic orders. Dobutamine stress echo order comments utilization of treadmill. Dobutamine order converted to exercise stress echo order.   Central scheduling notified for ne

## 2021-05-06 ENCOUNTER — HOSPITAL ENCOUNTER (OUTPATIENT)
Dept: CV DIAGNOSTICS | Facility: HOSPITAL | Age: 46
Discharge: HOME | End: 2021-05-06
Attending: FAMILY MEDICINE
Payer: COMMERCIAL

## 2021-05-06 ENCOUNTER — APPOINTMENT (OUTPATIENT)
Dept: GENERAL RADIOLOGY | Facility: HOSPITAL | Age: 46
End: 2021-05-06
Attending: FAMILY MEDICINE
Payer: COMMERCIAL

## 2021-05-17 ENCOUNTER — LAB ENCOUNTER (OUTPATIENT)
Dept: LAB | Age: 46
End: 2021-05-17
Attending: FAMILY MEDICINE
Payer: COMMERCIAL

## 2021-05-17 DIAGNOSIS — Z01.818 PRE-PROCEDURAL EXAMINATION: ICD-10-CM

## 2021-05-17 DIAGNOSIS — Z11.59 SCREENING FOR VIRAL DISEASE: ICD-10-CM

## 2021-05-20 ENCOUNTER — HOSPITAL ENCOUNTER (OUTPATIENT)
Dept: CV DIAGNOSTICS | Facility: HOSPITAL | Age: 46
Discharge: HOME | End: 2021-05-20
Attending: FAMILY MEDICINE
Payer: COMMERCIAL

## 2021-05-20 ENCOUNTER — HOSPITAL ENCOUNTER (OUTPATIENT)
Dept: GENERAL RADIOLOGY | Facility: HOSPITAL | Age: 46
Discharge: HOME | End: 2021-05-20
Attending: FAMILY MEDICINE
Payer: COMMERCIAL

## 2021-05-20 ENCOUNTER — HOSPITAL ENCOUNTER (OUTPATIENT)
Dept: ULTRASOUND IMAGING | Facility: HOSPITAL | Age: 46
Discharge: HOME | End: 2021-05-20
Attending: PHYSICIAN ASSISTANT
Payer: COMMERCIAL

## 2021-05-20 DIAGNOSIS — R68.89 SENSITIVITY TO THE COLD: ICD-10-CM

## 2021-05-20 DIAGNOSIS — R07.9 CHEST PAIN, UNSPECIFIED TYPE: ICD-10-CM

## 2021-05-20 DIAGNOSIS — M79.605 PAIN IN BOTH LOWER EXTREMITIES: ICD-10-CM

## 2021-05-20 DIAGNOSIS — M79.604 PAIN IN BOTH LOWER EXTREMITIES: ICD-10-CM

## 2021-05-20 PROCEDURE — 93970 EXTREMITY STUDY: CPT | Performed by: PHYSICIAN ASSISTANT

## 2021-05-20 PROCEDURE — 93350 STRESS TTE ONLY: CPT | Performed by: FAMILY MEDICINE

## 2021-05-20 PROCEDURE — 71045 X-RAY EXAM CHEST 1 VIEW: CPT | Performed by: FAMILY MEDICINE

## 2021-05-20 PROCEDURE — 93018 CV STRESS TEST I&R ONLY: CPT | Performed by: FAMILY MEDICINE

## 2021-05-20 PROCEDURE — 93017 CV STRESS TEST TRACING ONLY: CPT | Performed by: FAMILY MEDICINE

## 2021-05-20 NOTE — PROGRESS NOTES
Imaging report reviewed and shows no concerning findings.  Please notify patient.    -Dr. Lorenzo Contreras

## 2021-05-27 ENCOUNTER — OFFICE VISIT (OUTPATIENT)
Dept: FAMILY MEDICINE CLINIC | Facility: CLINIC | Age: 46
End: 2021-05-27
Payer: COMMERCIAL

## 2021-05-27 VITALS
WEIGHT: 247 LBS | HEART RATE: 84 BPM | DIASTOLIC BLOOD PRESSURE: 80 MMHG | TEMPERATURE: 98 F | BODY MASS INDEX: 37.44 KG/M2 | SYSTOLIC BLOOD PRESSURE: 120 MMHG | RESPIRATION RATE: 16 BRPM | OXYGEN SATURATION: 99 % | HEIGHT: 68 IN

## 2021-05-27 DIAGNOSIS — I10 ESSENTIAL HYPERTENSION: Primary | ICD-10-CM

## 2021-05-27 DIAGNOSIS — R07.89 CHEST WALL PAIN: ICD-10-CM

## 2021-05-27 PROCEDURE — 99214 OFFICE O/P EST MOD 30 MIN: CPT | Performed by: FAMILY MEDICINE

## 2021-05-27 PROCEDURE — 3074F SYST BP LT 130 MM HG: CPT | Performed by: FAMILY MEDICINE

## 2021-05-27 PROCEDURE — 3008F BODY MASS INDEX DOCD: CPT | Performed by: FAMILY MEDICINE

## 2021-05-27 PROCEDURE — 3079F DIAST BP 80-89 MM HG: CPT | Performed by: FAMILY MEDICINE

## 2021-05-27 RX ORDER — MELOXICAM 7.5 MG/1
7.5 TABLET ORAL DAILY
Qty: 90 TABLET | Refills: 1 | Status: SHIPPED | OUTPATIENT
Start: 2021-05-27 | End: 2022-01-23

## 2021-05-27 NOTE — PATIENT INSTRUCTIONS
Thank you for choosing Meliza Adame MD at [place] [address]  To Do: Torri Olson  1. High Blood pressure  What Is a Normal Blood Pressure?   The Joint National Committee on Prevention, Detection, Evaluation, and Treatment of High Blood Pressure has pelvis, and legs   Heart failure   Poor blood supply to the legs  Erectile Dysfunction  Problems with your vision    Overview  \"Blood pressure\" is the force of blood pushing against the walls of the arteries as the heart pumps blood.  If this pressure ris no more than 2,300 mg a day (eating only 1,500 mg a day is an even better goal). Reduce saturated fat to no more than 6% of daily calories and total fat to 27% of daily calories.  Low-fat dairy products appear to be especially beneficial for lowering syst rice or whole-wheat noodles can serve as the main dish for a meal.   • Fresh or frozen vegetables are both good choices. When buying frozen and canned vegetables, choose those labeled as low sodium or without added salt.    • To increase the number of servi lactose intolerance. • Go easy on regular and even fat-free cheeses because they are typically high in sodium.    Lean meat, poultry and fish (6 or fewer servings a day)  Meat can be a rich source of protein, B vitamins, iron and zinc. But because even le benefits. Fats and oils (2 to 3 servings a day)  Fat helps your body absorb essential vitamins and helps your body's immune system. But too much fat increases your risk of heart disease, diabetes and obesity.  The DASH diet strives for a healthy balance b DASH diet: Alcohol and caffeine  Drinking too much alcohol can increase blood pressure. The DASH diet recommends that men limit alcohol to two or fewer drinks a day and women one or less. The DASH diet doesn't address caffeine consumption.  The influenc bland, gradually introduce low-sodium foods and cut back on table salt until you reach your sodium goal. That'll give your palate time to adjust. It can take several weeks for your taste buds to get used to less salty foods.      [facility] is loc notify us and stop treatment if problems arise, but know that our intention is that the benefits outweigh those potential risks and we strive to make you healthier and to improve your quality of life.     Referrals, and Radiology Information:    If your ins

## 2021-10-25 RX ORDER — METOPROLOL SUCCINATE 25 MG/1
TABLET, EXTENDED RELEASE ORAL
Qty: 30 TABLET | Refills: 5 | Status: SHIPPED | OUTPATIENT
Start: 2021-10-25

## 2022-01-23 RX ORDER — MELOXICAM 7.5 MG/1
TABLET ORAL
Qty: 30 TABLET | Refills: 5 | Status: SHIPPED | OUTPATIENT
Start: 2022-01-23

## 2022-08-09 RX ORDER — METOPROLOL SUCCINATE 25 MG/1
25 TABLET, EXTENDED RELEASE ORAL DAILY
Qty: 30 TABLET | Refills: 5 | Status: SHIPPED | OUTPATIENT
Start: 2022-08-09

## 2022-10-03 ENCOUNTER — APPOINTMENT (OUTPATIENT)
Dept: URBAN - METROPOLITAN AREA CLINIC 247 | Age: 47
Setting detail: DERMATOLOGY
End: 2022-10-03

## 2022-10-03 DIAGNOSIS — B07.8 OTHER VIRAL WARTS: ICD-10-CM

## 2022-10-03 DIAGNOSIS — L20.89 OTHER ATOPIC DERMATITIS: ICD-10-CM

## 2022-10-03 PROBLEM — L20.84 INTRINSIC (ALLERGIC) ECZEMA: Status: ACTIVE | Noted: 2022-10-03

## 2022-10-03 PROCEDURE — 99203 OFFICE O/P NEW LOW 30 MIN: CPT | Mod: 25

## 2022-10-03 PROCEDURE — OTHER COUNSELING: OTHER

## 2022-10-03 PROCEDURE — 17110 DESTRUCT B9 LESION 1-14: CPT

## 2022-10-03 PROCEDURE — OTHER LIQUID NITROGEN: OTHER

## 2022-10-03 PROCEDURE — OTHER PRESCRIPTION: OTHER

## 2022-10-03 PROCEDURE — OTHER COUNSELING: TOPICAL STEROIDS: OTHER

## 2022-10-03 PROCEDURE — OTHER PRESCRIPTION MEDICATION MANAGEMENT: OTHER

## 2022-10-03 RX ORDER — BETAMETHASONE DIPROPIONATE 0.5 MG/ML
LOTION TOPICAL
Qty: 60 | Refills: 3 | Status: ERX | COMMUNITY
Start: 2022-10-03

## 2022-10-03 RX ORDER — BETAMETHASONE DIPROPIONATE 0.5 MG/G
OINTMENT TOPICAL
Qty: 45 | Refills: 3 | Status: ERX | COMMUNITY
Start: 2022-10-03

## 2022-10-03 ASSESSMENT — LOCATION DETAILED DESCRIPTION DERM
LOCATION DETAILED: LEFT SUPERIOR LATERAL BUCCAL CHEEK
LOCATION DETAILED: RIGHT CENTRAL MALAR CHEEK
LOCATION DETAILED: LEFT MEDIAL MALAR CHEEK
LOCATION DETAILED: RIGHT INFERIOR CENTRAL MALAR CHEEK
LOCATION DETAILED: LEFT CENTRAL MALAR CHEEK
LOCATION DETAILED: LEFT INFERIOR CENTRAL MALAR CHEEK
LOCATION DETAILED: LEFT INFERIOR LATERAL MALAR CHEEK
LOCATION DETAILED: LEFT SUPERIOR PARIETAL SCALP

## 2022-10-03 ASSESSMENT — LOCATION SIMPLE DESCRIPTION DERM
LOCATION SIMPLE: SCALP
LOCATION SIMPLE: LEFT CHEEK
LOCATION SIMPLE: RIGHT CHEEK

## 2022-10-03 ASSESSMENT — LOCATION ZONE DERM
LOCATION ZONE: FACE
LOCATION ZONE: SCALP

## 2022-10-03 NOTE — PROCEDURE: PRESCRIPTION MEDICATION MANAGEMENT
Initiate Treatment: Betamethasone lotion to aa bid PRN flare\\nBetamethasone ointment to aa bid PRN flare
Detail Level: Zone
Render In Strict Bullet Format?: No

## 2022-10-05 ENCOUNTER — OFFICE VISIT (OUTPATIENT)
Dept: FAMILY MEDICINE CLINIC | Facility: CLINIC | Age: 47
End: 2022-10-05
Payer: COMMERCIAL

## 2022-10-05 ENCOUNTER — HOSPITAL ENCOUNTER (OUTPATIENT)
Dept: MAMMOGRAPHY | Age: 47
Discharge: HOME | End: 2022-10-05
Attending: NURSE PRACTITIONER
Payer: COMMERCIAL

## 2022-10-05 ENCOUNTER — HOSPITAL ENCOUNTER (OUTPATIENT)
Dept: GENERAL RADIOLOGY | Age: 47
Discharge: HOME | End: 2022-10-05
Attending: FAMILY MEDICINE
Payer: COMMERCIAL

## 2022-10-05 ENCOUNTER — OFFICE VISIT (OUTPATIENT)
Dept: OBGYN CLINIC | Facility: CLINIC | Age: 47
End: 2022-10-05
Payer: COMMERCIAL

## 2022-10-05 VITALS
HEART RATE: 78 BPM | WEIGHT: 262.38 LBS | SYSTOLIC BLOOD PRESSURE: 120 MMHG | TEMPERATURE: 98 F | BODY MASS INDEX: 39.76 KG/M2 | RESPIRATION RATE: 16 BRPM | DIASTOLIC BLOOD PRESSURE: 80 MMHG | HEIGHT: 68 IN

## 2022-10-05 VITALS
SYSTOLIC BLOOD PRESSURE: 120 MMHG | HEART RATE: 78 BPM | DIASTOLIC BLOOD PRESSURE: 80 MMHG | BODY MASS INDEX: 40 KG/M2 | WEIGHT: 262.25 LBS

## 2022-10-05 DIAGNOSIS — Z01.419 WELL WOMAN EXAM WITH ROUTINE GYNECOLOGICAL EXAM: Primary | ICD-10-CM

## 2022-10-05 DIAGNOSIS — I10 ESSENTIAL HYPERTENSION: ICD-10-CM

## 2022-10-05 DIAGNOSIS — Z00.00 ROUTINE GENERAL MEDICAL EXAMINATION AT A HEALTH CARE FACILITY: ICD-10-CM

## 2022-10-05 DIAGNOSIS — R10.2 PELVIC PAIN: ICD-10-CM

## 2022-10-05 DIAGNOSIS — Z12.4 CERVICAL CANCER SCREENING: ICD-10-CM

## 2022-10-05 DIAGNOSIS — M79.672 LEFT FOOT PAIN: ICD-10-CM

## 2022-10-05 DIAGNOSIS — M79.645 PAIN IN FINGER OF LEFT HAND: ICD-10-CM

## 2022-10-05 DIAGNOSIS — Z00.00 WELLNESS EXAMINATION: Primary | ICD-10-CM

## 2022-10-05 DIAGNOSIS — Z12.31 ENCOUNTER FOR SCREENING MAMMOGRAM FOR MALIGNANT NEOPLASM OF BREAST: ICD-10-CM

## 2022-10-05 DIAGNOSIS — E55.9 VITAMIN D DEFICIENCY: ICD-10-CM

## 2022-10-05 PROCEDURE — 73630 X-RAY EXAM OF FOOT: CPT | Performed by: FAMILY MEDICINE

## 2022-10-05 PROCEDURE — 99396 PREV VISIT EST AGE 40-64: CPT | Performed by: FAMILY MEDICINE

## 2022-10-05 PROCEDURE — 3008F BODY MASS INDEX DOCD: CPT | Performed by: FAMILY MEDICINE

## 2022-10-05 PROCEDURE — 3074F SYST BP LT 130 MM HG: CPT | Performed by: NURSE PRACTITIONER

## 2022-10-05 PROCEDURE — 73140 X-RAY EXAM OF FINGER(S): CPT | Performed by: FAMILY MEDICINE

## 2022-10-05 PROCEDURE — 99396 PREV VISIT EST AGE 40-64: CPT | Performed by: NURSE PRACTITIONER

## 2022-10-05 PROCEDURE — 99213 OFFICE O/P EST LOW 20 MIN: CPT | Performed by: FAMILY MEDICINE

## 2022-10-05 PROCEDURE — 3074F SYST BP LT 130 MM HG: CPT | Performed by: FAMILY MEDICINE

## 2022-10-05 PROCEDURE — 77063 BREAST TOMOSYNTHESIS BI: CPT | Performed by: NURSE PRACTITIONER

## 2022-10-05 PROCEDURE — 3079F DIAST BP 80-89 MM HG: CPT | Performed by: NURSE PRACTITIONER

## 2022-10-05 PROCEDURE — 77067 SCR MAMMO BI INCL CAD: CPT | Performed by: NURSE PRACTITIONER

## 2022-10-05 PROCEDURE — 3079F DIAST BP 80-89 MM HG: CPT | Performed by: FAMILY MEDICINE

## 2022-10-05 RX ORDER — METOPROLOL SUCCINATE 25 MG/1
25 TABLET, EXTENDED RELEASE ORAL DAILY
Qty: 90 TABLET | Refills: 1 | Status: SHIPPED | OUTPATIENT
Start: 2022-10-05

## 2022-10-05 RX ORDER — BETAMETHASONE DIPROPIONATE 0.5 MG/G
LOTION TOPICAL
COMMUNITY
Start: 2022-10-03

## 2022-10-05 NOTE — PROGRESS NOTES
Imaging report reviewed and shows no concerning findings. Please notify patient.   Please try OTC Tylenol as needed for pain  -Dr. Lexie James

## 2022-10-06 LAB
ALBUMIN/GLOBULIN RATIO: 1.4 (CALC) (ref 1–2.5)
ALBUMIN: 4.1 G/DL (ref 3.6–5.1)
ALKALINE PHOSPHATASE: 88 U/L (ref 31–125)
ALT: 19 U/L (ref 6–29)
AST: 14 U/L (ref 10–35)
BILIRUBIN, TOTAL: 0.5 MG/DL (ref 0.2–1.2)
BUN: 8 MG/DL (ref 7–25)
CALCIUM: 9 MG/DL (ref 8.6–10.2)
CARBON DIOXIDE: 25 MMOL/L (ref 20–32)
CHLORIDE: 105 MMOL/L (ref 98–110)
CHOL/HDLC RATIO: 4.6 (CALC)
CHOLESTEROL, TOTAL: 203 MG/DL
CREATININE: 0.59 MG/DL (ref 0.5–0.99)
EGFR: 113 ML/MIN/1.73M2
GLOBULIN: 2.9 G/DL (CALC) (ref 1.9–3.7)
GLUCOSE: 89 MG/DL (ref 65–99)
HDL CHOLESTEROL: 44 MG/DL
HEMATOCRIT: 46.5 % (ref 35–45)
HEMOGLOBIN: 15.1 G/DL (ref 11.7–15.5)
LDL-CHOLESTEROL: 132 MG/DL (CALC)
MCH: 27.6 PG (ref 27–33)
MCHC: 32.5 G/DL (ref 32–36)
MCV: 85 FL (ref 80–100)
MPV: 11.1 FL (ref 7.5–12.5)
NON-HDL CHOLESTEROL: 159 MG/DL (CALC)
PLATELET COUNT: 352 THOUSAND/UL (ref 140–400)
POTASSIUM: 4.2 MMOL/L (ref 3.5–5.3)
PROTEIN, TOTAL: 7 G/DL (ref 6.1–8.1)
RDW: 12.2 % (ref 11–15)
RED BLOOD CELL COUNT: 5.47 MILLION/UL (ref 3.8–5.1)
SODIUM: 137 MMOL/L (ref 135–146)
TRIGLYCERIDES: 152 MG/DL
TSH W/REFLEX TO FT4: 2.56 MIU/L
WHITE BLOOD CELL COUNT: 8.6 THOUSAND/UL (ref 3.8–10.8)

## 2022-10-08 LAB — HPV MRNA E6/E7: NOT DETECTED

## 2022-10-26 LAB — AMB EXT COLOGUARD RESULT: NEGATIVE

## 2022-11-05 ENCOUNTER — TELEPHONE (OUTPATIENT)
Dept: FAMILY MEDICINE CLINIC | Facility: CLINIC | Age: 47
End: 2022-11-05

## 2022-11-05 NOTE — TELEPHONE ENCOUNTER
Received Cologuard results that was completed on 10/26/22. Health maintenance was updated and patient was notified of results. Report sent to scan.

## 2023-03-21 RX ORDER — METOPROLOL SUCCINATE 25 MG/1
25 TABLET, EXTENDED RELEASE ORAL DAILY
Qty: 30 TABLET | Refills: 5 | Status: SHIPPED | OUTPATIENT
Start: 2023-03-21

## 2023-07-14 ENCOUNTER — E-VISIT (OUTPATIENT)
Dept: TELEHEALTH | Age: 48
End: 2023-07-14

## 2023-07-14 DIAGNOSIS — J06.9 VIRAL URI: Primary | ICD-10-CM

## 2023-07-15 ENCOUNTER — TELEMEDICINE (OUTPATIENT)
Dept: FAMILY MEDICINE CLINIC | Facility: CLINIC | Age: 48
End: 2023-07-15
Payer: COMMERCIAL

## 2023-07-15 DIAGNOSIS — H65.92 MUCOID OTITIS MEDIA OF LEFT EAR WITH EFFUSION: Primary | ICD-10-CM

## 2023-07-15 PROCEDURE — 99213 OFFICE O/P EST LOW 20 MIN: CPT | Performed by: FAMILY MEDICINE

## 2023-07-15 RX ORDER — FLUTICASONE PROPIONATE 50 MCG
2 SPRAY, SUSPENSION (ML) NASAL DAILY
Qty: 15.8 ML | Refills: 0 | Status: SHIPPED | OUTPATIENT
Start: 2023-07-15 | End: 2023-08-14

## 2023-07-15 RX ORDER — AMOXICILLIN 500 MG/1
500 CAPSULE ORAL 3 TIMES DAILY
Qty: 21 CAPSULE | Refills: 0 | Status: SHIPPED | OUTPATIENT
Start: 2023-07-15 | End: 2023-07-22

## 2023-07-15 NOTE — PROGRESS NOTES
Subjective    This visit is conducted using Telemedicine with live, interactive video and audio. Chief Complaint:  Patient presents with:  Ear Pain        The patient confirmed knowledge of the limitations of the use of telemedicine were verbally confirmed by the provider. Verification of patient identity was established. Patient understands and accepts financial responsibility for any deductible, co-insurance and/or co-pays associated with this service. Patient complains of:  Patient complains of fluid in her ear. She has a pain and pressure in the left ear. Onset was a couple weeks ago. The pain worsened yesterday. She does not get a lot of ear infections. Denies drainage from the ear. Denies sore throat, fevers, chills, coughing, hearing loss. She tried taking allergy medication but that has not helped. Review of Systems   Constitutional: Negative for fever, chills and fatigue. No distress. HENT: Negative for hearing loss, congestion, sore throat, neck pain    Eyes: Negative for pain and visual disturbance. Respiratory: Negative for cough, chest tightness, shortness of breath and wheezing. Cardiovascular: Negative for chest pain, palpitations and leg swelling. Gastrointestinal: Negative for nausea, vomiting, abdominal pain     HISTORY:  Past Medical History:   Diagnosis Date    Amenorrhea     IUD    Back pain     Chronic low back pain 5/9/2016    Lumbar disc herniation 5/9/2016    L low back pain L5    Ovarian cyst     Severe obesity (BMI 35.0-39. 9) 5/9/2016      Past Surgical History:   Procedure Laterality Date    LAPAROSCOPY,PELVIC,BIOPSY      diagnostic    REMOVAL OF OVARIAN CYST(S)      age 25s, for ovarian cysts      Family History   Problem Relation Age of Onset    Breast Cancer Maternal Grandmother 46    Hypertension Father     Obesity Father     Diabetes Mother         borderline    Hypertension Mother     Obesity Mother       Social History     Socioeconomic History Marital status:    Tobacco Use    Smoking status: Never    Smokeless tobacco: Never   Vaping Use    Vaping Use: Never used   Substance and Sexual Activity    Alcohol use: Yes     Alcohol/week: 0.0 standard drinks of alcohol     Comment: rarely    Drug use: No    Sexual activity: Yes     Partners: Male     Birth control/protection: I.U.D., Mirena   Other Topics Concern    Caffeine Concern Yes     Comment: 1-2 cups of coffee daily     Exercise Yes     Comment: plays sports/ walks     Seat Belt Yes              Objective    Physical Exam:  alert, appears stated age, and cooperative, lips, mucosa, and tongue normal; teeth and gums normal, Speaking in full sentences comfortably, and Normal work of breathing    Psych: Mood is stable, Affect appropriate    Assessment/Plan:    1. Mucoid otitis media of left ear with effusion  Started on amoxicillin for suspect left OM. Also started on flonase. Recommended increased fluids/humidity/ Tylenol as needed/Ibuprofen as needed  Recheck if not improved in one week or sooner if worsening. Diagnostic rationale, follow up instructions, and strict precautions/indications for emergent direct evaluation were discussed with the patient. The patient agrees with the plan, and understands to follow up with their primary care physician or other healthcare provider within 48-72 hours for reevaluation for persistent or worsening symptoms. Patient understands phone evaluation is not a substitute for face-to-face examination or emergency care. Patient advised to go to ER or call 911 for worsening symptoms or acute distress.          Merline Negro, DO

## 2023-07-15 NOTE — PROGRESS NOTES
Jose Quintanilla is a 57year old female. HPI:   See answers to questions above. Sinus drainage, sore throat, and ear pain for 3 days. Current Outpatient Medications   Medication Sig Dispense Refill    METOPROLOL SUCCINATE ER 25 MG Oral Tablet 24 Hr TAKE 1 TABLET (25 MG TOTAL) BY MOUTH DAILY. 30 tablet 5    Betamethasone Dipropionate 0.05 % External Lotion APPLY TO AFFECTED AREA TWICE DAILY AS NEEDED FOR FLARE      Fexofenadine HCl 180 MG Oral Tab Take 180 mg by mouth daily as needed. Clobetasol Propionate 0.05 % External Cream APPLY TOPICALLY TO ECZEMA TWICE DAILY      Multiple Vitamins-Minerals (MULTI-VITAMIN/MINERALS) Oral Tab Take 1 tablet by mouth daily. triamcinolone acetonide 0.025 % External Cream Apply 1 Application topically 2 (two) times daily. 1 Tube 0    omeprazole 20 MG Oral Capsule Delayed Release Take 20 mg by mouth daily. Past Medical History:   Diagnosis Date    Amenorrhea     IUD    Back pain     Chronic low back pain 5/9/2016    Lumbar disc herniation 5/9/2016    L low back pain L5    Ovarian cyst     Severe obesity (BMI 35.0-39. 9) 5/9/2016      Past Surgical History:   Procedure Laterality Date    LAPAROSCOPY,PELVIC,BIOPSY      diagnostic    REMOVAL OF OVARIAN CYST(S)      age 27s, for ovarian cysts      Family History   Problem Relation Age of Onset    Breast Cancer Maternal Grandmother 46    Hypertension Father     Obesity Father     Diabetes Mother         borderline    Hypertension Mother     Obesity Mother       Social History:  Social History     Socioeconomic History    Marital status:    Tobacco Use    Smoking status: Never    Smokeless tobacco: Never   Vaping Use    Vaping Use: Never used   Substance and Sexual Activity    Alcohol use:  Yes     Alcohol/week: 0.0 standard drinks of alcohol     Comment: rarely    Drug use: No    Sexual activity: Yes     Partners: Male     Birth control/protection: I.U.D., Mirena   Other Topics Concern    Caffeine Concern Yes Comment: 1-2 cups of coffee daily     Exercise Yes     Comment: plays sports/ walks     Seat Belt Yes         ASSESSMENT AND PLAN:     Viral uri  (primary encounter diagnosis)    Provided with comfort measures. Offered COVID testing/ strep testing however no reply from patient after over 8 hours. Advised to seek in person evaluation for any new or worsening symptoms or if ear pain is severe.      Meds & Refills for this Visit:  Requested Prescriptions      No prescriptions requested or ordered in this encounter       Duration of  the service:  10 minutes    Patient advised to follow up with PCP if no improvement or worsening of symptoms  Refer to [employer] message for specific patient instructions

## 2023-08-07 RX ORDER — FLUTICASONE PROPIONATE 50 MCG
2 SPRAY, SUSPENSION (ML) NASAL DAILY
Qty: 16 ML | Refills: 0 | Status: SHIPPED | OUTPATIENT
Start: 2023-08-07

## 2023-08-07 NOTE — TELEPHONE ENCOUNTER
Requested Renewals     Name from pharmacy: FLUTICASONE PROP 50 MCG SPRAY         Will file in chart as: FLUTICASONE PROPIONATE 50 MCG/ACT Nasal Suspension    Sig: SPRAY 2 SPRAYS INTO EACH NOSTRIL EVERY DAY    Disp: 16 mL    Refills: 0 (Pharmacy requested: Not specified)    Start: 8/6/2023    Class: Normal    Non-formulary    Last ordered: 3 weeks ago by Virginia Uriostegui DO Last refill: 7/15/2023    Rx #: [id number]    Allergy Medication Protocol [id number]/2023 01:31 PM    Appointment in the past 12 or next 3 months             No future appointments.   LOV: 7/15/23 telemedicine  Last physical exam done 10/5/22  RX sent

## 2023-08-23 RX ORDER — FLUTICASONE PROPIONATE 50 MCG
2 SPRAY, SUSPENSION (ML) NASAL DAILY
Qty: 16 ML | Refills: 0 | Status: SHIPPED | OUTPATIENT
Start: 2023-08-23

## 2023-08-23 NOTE — TELEPHONE ENCOUNTER
fluticasone propionate 50 MCG/ACT Nasal Suspension          Si sprays by Nasal route daily.     Disp: 16 mL    Refills: 0    Start: 2023    Class: Normal    Last ordered: 2 weeks ago by Karen Caraballo DO     Allergy Medication Protocol [id number] 03:06 PM    Appointment in the past 12 or next 3 months      To be filled at: [facility]/pharmacy #[id number]- [address] [place], [place] - [address] [facility] [phone]

## 2023-08-28 RX ORDER — FLUTICASONE PROPIONATE 50 MCG
2 SPRAY, SUSPENSION (ML) NASAL DAILY
Qty: 48 ML | Refills: 0 | OUTPATIENT
Start: 2023-08-28

## 2023-08-30 ENCOUNTER — HOSPITAL ENCOUNTER (OUTPATIENT)
Dept: ULTRASOUND IMAGING | Age: 48
Discharge: HOME | End: 2023-08-30
Attending: NURSE PRACTITIONER
Payer: COMMERCIAL

## 2023-08-30 DIAGNOSIS — R10.2 PELVIC PAIN: ICD-10-CM

## 2023-08-30 PROCEDURE — 76856 US EXAM PELVIC COMPLETE: CPT | Performed by: NURSE PRACTITIONER

## 2023-08-30 PROCEDURE — 76830 TRANSVAGINAL US NON-OB: CPT | Performed by: NURSE PRACTITIONER

## 2023-11-24 ENCOUNTER — PATIENT MESSAGE (OUTPATIENT)
Dept: FAMILY MEDICINE CLINIC | Facility: CLINIC | Age: 48
End: 2023-11-24

## 2023-11-28 NOTE — TELEPHONE ENCOUNTER
Pt took BP last night and it was high-164/108, 153/99, 149/97, 152/99 this morning. Pt currently taking BP meds. Has headache for the last couple months.

## 2023-11-29 ENCOUNTER — OFFICE VISIT (OUTPATIENT)
Dept: FAMILY MEDICINE CLINIC | Facility: CLINIC | Age: 48
End: 2023-11-29
Payer: COMMERCIAL

## 2023-11-29 ENCOUNTER — TELEPHONE (OUTPATIENT)
Dept: FAMILY MEDICINE CLINIC | Facility: CLINIC | Age: 48
End: 2023-11-29

## 2023-11-29 VITALS
RESPIRATION RATE: 18 BRPM | HEIGHT: 68 IN | DIASTOLIC BLOOD PRESSURE: 92 MMHG | WEIGHT: 214 LBS | SYSTOLIC BLOOD PRESSURE: 140 MMHG | BODY MASS INDEX: 32.43 KG/M2 | HEART RATE: 72 BPM

## 2023-11-29 DIAGNOSIS — G44.209 TENSION HEADACHE: ICD-10-CM

## 2023-11-29 DIAGNOSIS — F41.9 ANXIETY: ICD-10-CM

## 2023-11-29 DIAGNOSIS — I10 ESSENTIAL HYPERTENSION: Primary | ICD-10-CM

## 2023-11-29 PROCEDURE — 3080F DIAST BP >= 90 MM HG: CPT | Performed by: FAMILY MEDICINE

## 2023-11-29 PROCEDURE — 99215 OFFICE O/P EST HI 40 MIN: CPT | Performed by: FAMILY MEDICINE

## 2023-11-29 PROCEDURE — 3008F BODY MASS INDEX DOCD: CPT | Performed by: FAMILY MEDICINE

## 2023-11-29 PROCEDURE — 3077F SYST BP >= 140 MM HG: CPT | Performed by: FAMILY MEDICINE

## 2023-11-29 RX ORDER — AMLODIPINE BESYLATE 5 MG/1
5 TABLET ORAL DAILY
Qty: 90 TABLET | Refills: 1 | Status: SHIPPED | OUTPATIENT
Start: 2023-11-29 | End: 2024-11-23

## 2023-11-29 RX ORDER — FLUOXETINE 10 MG/1
10 TABLET, FILM COATED ORAL DAILY
Qty: 30 TABLET | Refills: 1 | Status: SHIPPED | OUTPATIENT
Start: 2023-11-29

## 2023-11-29 NOTE — PROGRESS NOTES
Subjective:   Patient ID: Vanessa Claude is a 57year old female. HPI  Ms. Denver Faith is a pleasant 61-year-old female with known history of hypertension and anxiety here today for management of blood pressure. She has been experiencing headaches mostly on the back of his head, in particular left occipital region. He has been going to the chiropractor. It is getting better this point but has been checking her blood pressure noted to be elevated. She has been taking metoprolol 25 mg extended release daily compliantly. She however has been going to stress and anxiety. She tends to not do well on her a lot of people. She would like to try medication at this point. She tends to previous meditate and get worried even if it is months away. I had reviewed past medical and family histories together with allergy and medication lists documented. History/Other:   Review of Systems   Constitutional:  Negative for fatigue and fever. HENT:  Negative for sore throat and trouble swallowing. Respiratory:  Negative for cough and shortness of breath. Cardiovascular:  Negative for chest pain. Gastrointestinal:  Negative for abdominal pain, diarrhea, nausea and vomiting. Neurological:  Positive for headaches. Current Outpatient Medications   Medication Sig Dispense Refill    amLODIPine 5 MG Oral Tab Take 1 tablet (5 mg total) by mouth daily. 90 tablet 1    FLUoxetine 10 MG Oral Tab Take 1 tablet (10 mg total) by mouth daily. 30 tablet 1    fluticasone propionate 50 MCG/ACT Nasal Suspension 2 sprays by Nasal route daily. 16 mL 0    METOPROLOL SUCCINATE ER 25 MG Oral Tablet 24 Hr TAKE 1 TABLET (25 MG TOTAL) BY MOUTH DAILY. 30 tablet 5     Allergies:No Known Allergies    Objective:   Physical Exam  Vitals reviewed. Constitutional:       General: She is not in acute distress. HENT:      Mouth/Throat:      Mouth: Mucous membranes are moist.      Pharynx: Oropharynx is clear.    Eyes:      General: No scleral icterus. Conjunctiva/sclera: Conjunctivae normal.   Cardiovascular:      Rate and Rhythm: Normal rate and regular rhythm. Heart sounds: Normal heart sounds. No murmur heard. Pulmonary:      Effort: Pulmonary effort is normal. No respiratory distress. Breath sounds: Normal breath sounds. No wheezing or rales. Abdominal:      General: Bowel sounds are normal.      Palpations: Abdomen is soft. Musculoskeletal:      Cervical back: Neck supple. Right lower leg: No edema. Left lower leg: No edema. Lymphadenopathy:      Cervical: No cervical adenopathy. Skin:     General: Skin is warm. Neurological:      Mental Status: She is alert. Psychiatric:         Mood and Affect: Mood normal.         Behavior: Behavior normal.         Assessment & Plan:   1. Essential hypertension   -She did show me her blood pressure readings from home and these are elevated  - Hold metoprolol and try amlodipine 5 mg daily  - Monitor blood pressure levels  - Follow-up in January   2. Anxiety   -Perhaps this could be a factor II to why her blood pressure is elevated  - Start fluoxetine 10 mg daily   3. Tension headache   -Likely not related to elevated blood pressure  - Continue going to chiropractor and will monitor at this point       This note was prepared using [employer] voice recognition dictation software. As a result errors may occur. When identified these errors have been corrected. While every attempt is made to correct errors during dictation discrepancies may still exist            Orders Placed This Encounter   Procedures    Fluzone 6 months and older, Quadrivalent, Preservative Free [65563]    TdaP (Boostrix) Vaccine (> 7 Y)       Meds This Visit:  Requested Prescriptions     Signed Prescriptions Disp Refills    amLODIPine 5 MG Oral Tab 90 tablet 1     Sig: Take 1 tablet (5 mg total) by mouth daily. FLUoxetine 10 MG Oral Tab 30 tablet 1     Sig: Take 1 tablet (10 mg total) by mouth daily. Imaging & Referrals:  INFLUENZA VACCINE, QUAD, PRESERVATIVE FREE, 0.5 ML  TETANUS, DIPHTHERIA TOXOIDS AND ACELLULAR PERTUSIS VACCINE (TDAP), >7 YEARS, IM USE

## 2023-11-29 NOTE — PATIENT INSTRUCTIONS
Thank you for choosing Alpesh Lin MD at [place] [address]  To Do: Jarrell Nunez  1. High Blood pressure  What Is a Normal Blood Pressure? The Joint National Committee on Prevention, Detection, Evaluation, and Treatment of High Blood Pressure has classified blood pressure measurements into several categories:  Normal blood pressure is systolic pressure less than 796 and diastolic pressure less than 80 mmHg. \"Prehypertension\" is systolic pressure of 954-676 or diastolic pressure of 26-09 mmHg. Stage 1 Hypertension is blood pressure greater than systolic pressure of 284-875 or diastolic pressure of 02-71 mmHg or greater. Stage 2 Hypertension is systolic pressure of 546 or greater or diastolic pressure of 057 or greater. What Health Problems Are Associated With High Blood Pressure? Several potentially serious health conditions are linked to high blood pressure, including: Atherosclerosis: a disease of the arteries caused by a buildup of plaque, or fatty material, on the inside walls of the blood vessels; hypertension contributes to this buildup by putting added stress and force on the artery walls. Heart Disease: Heart failure (the heart is not strong enough to pump blood adequately), ischemic heart disease (the heart tissue doesn't get enough blood), and hypertensive hypertrophic cardiomyopathy (thickened, abnormally functioning heart muscle) are all associated with high blood pressure. Kidney Disease: Hypertension can damage the blood vessels and filters in the kidneys, so that the kidneys cannot excrete waste properly. Stroke: Hypertension can lead to stroke, either by contributing to the process of atherosclerosis (which can lead to blockages and/or clots), or by weakening the blood vessel wall and causing it to rupture. Eye Disease: Hypertension can damage the very small blood vessels in the retina.   Bleeding from the aorta, the large blood vessel that supplies blood to the abdomen, pelvis, and legs   Heart failure   Poor blood supply to the legs  Erectile Dysfunction  Problems with your vision    Overview  \"Blood pressure\" is the force of blood pushing against the walls of the arteries as the heart pumps blood. If this pressure rises and stays high over time, it can damage the body in many ways. About 1 in 3 adults in the [place] has HBP. The condition itself usually has no signs or symptoms. You can have it for years without knowing it. During this time, though, HBP can damage your heart, blood vessels, kidneys, and other parts of your body. Knowing your blood pressure numbers is important, even when you're feeling fine. If your blood pressure is normal, you can work with your health care team to keep it that way. If your blood pressure is too high, treatment may help prevent damage to your body's organs. By [facility] staff   DASH stands for Dietary Approaches to Stop Hypertension. The DASH diet is a lifelong approach to healthy eating that's designed to help treat or prevent high blood pressure (hypertension). The DASH diet encourages you to reduce the sodium in your diet and eat a variety of foods rich in nutrients that help lower blood pressure, such as potassium, calcium and magnesium. By following the DASH diet, you may be able to reduce your blood pressure by a few points in just two weeks. Over time, your blood pressure could drop by eight to 14 points, which can make a significant difference in your health risks. DASH DIET  The low-salt Dietary Approaches to Stop Hypertension (DASH) diet is proven to help lower blood pressure. Its effects on blood pressure are sometimes seen within a few weeks. This diet is not only rich in important nutrients and fiber, but it also includes foods that contain far more potassium (4,700 milligrams (mg)/day), calcium (1,250 mg/day), and magnesium (500 mg/day) and much less sodium (salt) than the typical American diet.   Limit sodium to no more than 2,300 mg a day (eating only 1,500 mg a day is an even better goal). Reduce saturated fat to no more than 6% of daily calories and total fat to 27% of daily calories. Low-fat dairy products appear to be especially beneficial for lowering systolic blood pressure. When choosing fats, select monounsaturated oils, such as olive or canola oils. Choose whole grains over white flour or pasta products. Choose fresh fruits and vegetables every day. Many of these foods are rich in potassium, fiber, or both. Eat nuts, seeds, or legumes (dried beans or peas) daily. Choose modest amounts of protein (no more than 18% of total daily calories). Fish, skinless poultry, and soy products are the best protein sources. Other daily nutrient goals in the DASH diet include limiting carbohydrates to 55% of daily calories and dietary cholesterol to 150 mg. Try to get at least 30 grams (g) of daily fiber. Grains (6 to 8 servings a day)  Grains include bread, cereal, rice and pasta. Examples of one serving of grains include 1 slice whole-wheat bread, 1 ounce (oz.) dry cereal, or 1/2 cup cooked cereal, rice or pasta. Focus on whole grains because they have more fiber and nutrients than do refined grains. For instance, use brown rice instead of white rice, whole-wheat pasta instead of regular pasta and whole-grain bread instead of white bread. Look for products labeled \"100 percent whole grain\" or \"100 percent whole wheat. \"   Grains are naturally low in fat, so avoid spreading on butter or adding cream and cheese sauces. Vegetables (4 to 5 servings a day)  Tomatoes, carrots, broccoli, sweet potatoes, greens and other vegetables are full of fiber, vitamins, and such minerals as potassium and magnesium. Examples of one serving include 1 cup raw leafy green vegetables or 1/2 cup cut-up raw or cooked vegetables.    Don't think of vegetables only as side dishes -- a hearty blend of vegetables served over brown rice or whole-wheat noodles can serve as the main dish for a meal.   Fresh or frozen vegetables are both good choices. When buying frozen and canned vegetables, choose those labeled as low sodium or without added salt. To increase the number of servings you fit in daily, be creative. In a stir-torres, for instance, cut the amount of meat in half and double up on the vegetables. Fruits (4 to 5 servings a day)  Many fruits need little preparation to become a healthy part of a meal or snack. Like vegetables, they're packed with fiber, potassium and magnesium and are typically low in fat -- exceptions include avocados and coconuts. Examples of one serving include 1 medium fruit or 1/2 cup fresh, frozen or canned fruit. Have a piece of fruit with meals and one as a snack, then round out your day with a dessert of fresh fruits topped with a splash of low-fat yogurt. Leave on edible peels whenever possible. The peels of apples, pears and most fruits with pits add interesting texture to recipes and contain healthy nutrients and fiber. Remember that citrus fruits and juice, such as grapefruit, can interact with certain medications, so check with your doctor or pharmacist to see if they're OK for you. Dairy (2 to 3 servings a day)  Milk, yogurt, cheese and other dairy products are major sources of calcium, vitamin D and protein. But the key is to make sure that you choose dairy products that are low-fat or fat-free because otherwise they can be a major source of fat. Examples of one serving include 1 cup skim or 1% milk, 1 cup yogurt or 1 1/2 oz. cheese. Low-fat or fat-free frozen yogurt can help you boost the amount of dairy products you eat while offering a sweet treat. Add fruit for a healthy twist.   If you have trouble digesting dairy products, choose lactose-free products or consider taking an over-the-counter product that contains the enzyme lactase, which can reduce or prevent the symptoms of lactose intolerance. Go easy on regular and even fat-free cheeses because they are typically high in sodium. Lean meat, poultry and fish (6 or fewer servings a day)  Meat can be a rich source of protein, B vitamins, iron and zinc. But because even lean varieties contain fat and cholesterol, don't make them a mainstay of your diet -- cut back typical meat portions by one-third or one-half and pile on the vegetables instead. Examples of one serving include 1 oz. cooked skinless poultry, seafood or lean meat, 1 egg, or 1 oz. water-packed, no-salt-added canned tuna. Trim away skin and fat from meat and then broil, grill, roast or poach instead of frying. Eat heart-healthy fish, such as salmon, herring and tuna. These types of fish are high in omega-3 fatty acids, which can help lower your total cholesterol. Nuts, seeds and legumes (4 to 5 servings a week)   Almonds, sunflower seeds, kidney beans, peas, lentils and other foods in this family are good sources of magnesium, potassium and protein. They're also full of fiber and phytochemicals, which are plant compounds that may protect against some cancers and cardiovascular disease. Serving sizes are small and are intended to be consumed weekly because these foods are high in calories. Examples of one serving include 1/3 cup (1 1/2 oz.) nuts, 2 tablespoons seeds or 1/2 cup cooked beans or peas. Nuts sometimes get a bad rap because of their fat content, but they contain healthy types of fat -- monounsaturated fat and omega-3 fatty acids. They're high in calories, however, so eat them in moderation. Try adding them to stir-fries, salads or cereals. Soybean-based products, such as tofu and tempeh, can be a good alternative to meat because they contain all of the amino acids your body needs to make a complete protein, just like meat. They also contain isoflavones, a type of natural plant compound (phytochemical) that has been shown to have some health benefits.    Fats and oils (2 to 3 servings a day)  Fat helps your body absorb essential vitamins and helps your body's immune system. But too much fat increases your risk of heart disease, diabetes and obesity. The DASH diet strives for a healthy balance by providing 30 percent or less of daily calories from fat, with a focus on the healthier unsaturated fats. Examples of one serving include 1 teaspoon soft margarine, 1 tablespoon low-fat mayonnaise or 2 tablespoons light salad dressing. Saturated fat and trans fat are the main dietary culprits in raising your blood cholesterol and increasing your risk of coronary artery disease. DASH helps keep your daily saturated fat to less than 10 percent of your total calories by limiting use of meat, butter, cheese, whole milk, cream and eggs in your diet, along with foods made from lard, solid shortenings, and palm and coconut oils. Avoid trans fat, commonly found in such processed foods as crackers, baked goods and fried items. Read food labels on margarine and salad dressing so that you can choose those that are lowest in saturated fat and free of trans fat. Sweets (5 or fewer a week)  You don't have to banish sweets entirely while following the DASH diet -- just go easy on them. Examples of one serving include 1 tablespoon sugar, jelly or jam, 1/2 cup sorbet or 1 cup (8 oz.) lemonade. When you eat sweets, choose those that are fat-free or low-fat, such as sorbets, fruit ices, jelly beans, hard candy, mary crackers or low-fat cookies. Artificial sweeteners such as aspartame (NutraSweet, Equal) and sucralose (Splenda) may help satisfy your sweet tooth while sparing the sugar. But remember that you still must use them sensibly. It's OK to swap a diet cola for a regular cola, but not in place of a more nutritious beverage such as low-fat milk or even plain water. Cut back on added sugar, which has no nutritional value but can pack on calories.    DASH diet: Alcohol and caffeine  Drinking too much alcohol can increase blood pressure. The DASH diet recommends that men limit alcohol to two or fewer drinks a day and women one or less. The DASH diet doesn't address caffeine consumption. The influence of caffeine on blood pressure remains unclear. But caffeine can cause your blood pressure to rise at least temporarily. If you already have high blood pressure or if you think caffeine is affecting your blood pressure, talk to your doctor about your caffeine consumption. The DASH diet is not designed to promote weight loss, but it can be used as part of an overall weight-loss strategy. The DASH diet is based on a diet of about 2,000 calories a day. If you're trying to lose weight, though, you may want to eat around 1,600 a day. You may need to adjust your serving goals based on your health or individual circumstances -- something your health care team can help you decide. Tips to cut back on sodium  The foods at the core of the DASH diet are naturally low in sodium. So just by following the DASH diet, you're likely to reduce your sodium intake. You also can cut back on sodium in your diet by:   Using sodium-free spices or flavorings with your food instead of salt   Not adding salt when cooking rice, pasta or hot cereal   Rinsing canned foods to remove some of the sodium   Buying foods labeled \"no salt added,\" \"sodium-free,\" \"low sodium\" or \"very low sodium\"   One teaspoon of table salt has about 2,300 mg of sodium, and 2/3 teaspoon of table salt has about 1,500 mg of sodium. When you read food labels, you may be surprised at just how much sodium some processed foods contain. Even low-fat soups, canned vegetables, ready-to-eat cereals and sliced turkey from the local deli -- all foods you may have considered healthy -- often have lots of sodium. You may not notice a difference in taste when you choose low-sodium food and beverages.  If things seem too bland, gradually introduce low-sodium foods and cut back on table salt until you reach your sodium goal. That'll give your palate time to adjust. It can take several weeks for your taste buds to get used to less salty foods. [facility] is located in [address]. Monday, Tuesday & Friday - 8 a.m. to 4 p.m. Wednesday, Thursday - 7 a.m. to 3 p.m. The lab is closed daily from 12 p.m.-12:30 p.m. Saturday lab hours by appointment. Call [phone] to schedule the appointment. Please signup for [employer], which is electronic access to your record if you have not done so. All your results will post on there. [email or web address]   You can NOW use Hydrelis to book your appointments with us, or consider using open access scheduling which is through the [facility]ward website [email or web address] and type in Sharon Bush MD and follow the links for \"Schedule Online Now\"    To schedule Imaging or tests at [facility] Scheduling [phone], Go to [facility] A ER Building (For example: CT scans, X rays, Ultrasound, MRI)  Cardiac Testing in ER building Building A second floor Cardiac Testing [phone] (For example: Holter Monitor, Cardiac Stress tests,Event Monitor, or 2D Echocardiograms)  [facility] call [phone] usually in [facility] A  Walk in Clinic in [place] at [facility]. [address] Mon-Fri at 8am-7:30 p.m., and Sat/Sun 9:00a. m.-4:30 p.m. Also at [address]  Call [phone] for info     Please call our office about any questions regarding your treatment/medicines/tests as a result of today's visit. For your safety, read the entire package insert of all medicines prescribed to you and be aware of all of the risks of treatment even beyond those discussed today. All therapies have potential risk of harm or side effects or medication interactions.   It is your duty and for your safety to discuss with the pharmacist and our office with questions, and to notify us and stop treatment if problems arise, but know that our intention is that the benefits outweigh those potential risks and we strive to make you healthier and to improve your quality of life. Referrals, and Radiology Information:    If your insurance requires a referral to a specialist, please allow 5 business days to process your referral request.    If Natalie Blackman MD orders a CT or MRI, it may take up to 10 business days to receive approval from your insurance company. Once our office has called informing you that the insurance company approved your testing, please call Central Scheduling at [phone]  Please allow our office 5 business days to contact you regarding any testing results. Refill policies:   Allow 3 business days for refills; controlled substances may take longer and must be picked up from the office in person. Narcotic medications can only be filled in 30 day increments and must be refilled at an office visit only. If your prescription is due for a refill, you may be due for a follow-up appointment. We cannot refill your maintenance medications at a preventative wellness visit. To best provide you care, patients receiving maintenance medications need to be seen at least twice a year.

## 2023-11-29 NOTE — TELEPHONE ENCOUNTER
Chai PUENTE from [facility] ([facility]) for the Fluoxetine HCl 10 mg tablets. Key is [id number]. Placed in [employer] folder.

## 2023-12-01 NOTE — TELEPHONE ENCOUNTER
Recd 2nd fax in regards to the PA that is needed for the Fluoxetine HCl 10 mg tablets. Placed in [employer] folder.

## 2023-12-05 ENCOUNTER — TELEPHONE (OUTPATIENT)
Dept: FAMILY MEDICINE CLINIC | Facility: CLINIC | Age: 48
End: 2023-12-05

## 2023-12-05 NOTE — TELEPHONE ENCOUNTER
- Rec'd incoming fax request from pharmacy for Prior Authorization Key: [id number]  FLUoxetine HCI 10MG Tabs. Placed fax in [place] folder.

## 2023-12-21 ENCOUNTER — TELEPHONE (OUTPATIENT)
Dept: FAMILY MEDICINE CLINIC | Facility: CLINIC | Age: 48
End: 2023-12-21

## 2023-12-21 NOTE — TELEPHONE ENCOUNTER
PA for fluoxetine 10mg tab was denied by insurance because patient needs to try and fail one of the following generic SSRI's    - generic citalopram tablets  -generic citalopram oral solution  - generic escitalopram tablets  - generic fluoxetine oral solution  - generic fluvoxamine immediate release tablets  - generic paroxetine HCI immediate release tablets  - generic sertraline tablets  -generic sertraline oral solution      Please advise on medication change, thankyou!

## 2023-12-22 RX ORDER — CITALOPRAM HYDROBROMIDE 10 MG/1
10 TABLET ORAL DAILY
Qty: 30 TABLET | Refills: 1 | Status: SHIPPED | OUTPATIENT
Start: 2023-12-22

## 2024-02-01 ENCOUNTER — PATIENT MESSAGE (OUTPATIENT)
Dept: OBGYN CLINIC | Facility: CLINIC | Age: 49
End: 2024-02-01

## 2024-02-01 DIAGNOSIS — Z12.31 ENCOUNTER FOR SCREENING MAMMOGRAM FOR MALIGNANT NEOPLASM OF BREAST: Primary | ICD-10-CM

## 2024-02-06 NOTE — TELEPHONE ENCOUNTER
Last office visit 10/5/2022 with recommendation to RTC in 1 year.  No pending appointment.  Normal mammogram completed 10/5/2022.  Mammogram order pended for provider approval and signature.  Patient notified by [employer] that she is overdue for annual exam.

## 2024-02-06 NOTE — TELEPHONE ENCOUNTER
From: Jaci Doty  To: Judi Hanks  Sent: 2/1/2024 11:13 AM CST  Subject: Mamogram Request    Hello, can you send a referral over for my yearly Mamogram. I looked back at my chart and it looks like my last one was 10/2022. Thank you

## 2024-02-25 RX ORDER — CITALOPRAM HYDROBROMIDE 10 MG/1
10 TABLET ORAL DAILY
Qty: 30 TABLET | Refills: 1 | Status: SHIPPED | OUTPATIENT
Start: 2024-02-25

## 2024-03-06 RX ORDER — AMLODIPINE BESYLATE 5 MG/1
5 TABLET ORAL DAILY
Qty: 90 TABLET | Refills: 0 | Status: SHIPPED | OUTPATIENT
Start: 2024-03-06 | End: 2025-03-01

## 2024-03-06 NOTE — TELEPHONE ENCOUNTER
Last OV 11/29/23 for HTN   Disp Refills Start End    amLODIPine 5 MG Oral Tab 90 tablet 1 11/29/2023 11/23/2024    Sig - Route: Take 1 tablet (5 mg total) by mouth daily. - Oral    Sent to pharmacy as: amLODIPine Besylate 5 MG Oral Tablet (Norvasc)    E-Prescribing Status: Receipt confirmed by pharmacy (11/29/2023  7:29 AM CST)      RX sent for 90

## 2024-04-10 ENCOUNTER — OFFICE VISIT (OUTPATIENT)
Dept: OBGYN CLINIC | Facility: CLINIC | Age: 49
End: 2024-04-10
Payer: COMMERCIAL

## 2024-04-10 ENCOUNTER — HOSPITAL ENCOUNTER (OUTPATIENT)
Dept: MAMMOGRAPHY | Age: 49
Discharge: HOME | End: 2024-04-10
Attending: NURSE PRACTITIONER
Payer: COMMERCIAL

## 2024-04-10 VITALS
WEIGHT: 200 LBS | BODY MASS INDEX: 30.31 KG/M2 | HEIGHT: 68 IN | HEART RATE: 84 BPM | SYSTOLIC BLOOD PRESSURE: 118 MMHG | DIASTOLIC BLOOD PRESSURE: 90 MMHG

## 2024-04-10 DIAGNOSIS — Z12.4 CERVICAL CANCER SCREENING: ICD-10-CM

## 2024-04-10 DIAGNOSIS — Z12.31 ENCOUNTER FOR SCREENING MAMMOGRAM FOR MALIGNANT NEOPLASM OF BREAST: ICD-10-CM

## 2024-04-10 DIAGNOSIS — Z01.419 WELL WOMAN EXAM WITH ROUTINE GYNECOLOGICAL EXAM: Primary | ICD-10-CM

## 2024-04-10 PROCEDURE — 87624 HPV HI-RISK TYP POOLED RSLT: CPT | Performed by: NURSE PRACTITIONER

## 2024-04-10 PROCEDURE — 3074F SYST BP LT 130 MM HG: CPT | Performed by: NURSE PRACTITIONER

## 2024-04-10 PROCEDURE — 3080F DIAST BP >= 90 MM HG: CPT | Performed by: NURSE PRACTITIONER

## 2024-04-10 PROCEDURE — 88175 CYTOPATH C/V AUTO FLUID REDO: CPT | Performed by: NURSE PRACTITIONER

## 2024-04-10 PROCEDURE — 77067 SCR MAMMO BI INCL CAD: CPT | Performed by: NURSE PRACTITIONER

## 2024-04-10 PROCEDURE — 3008F BODY MASS INDEX DOCD: CPT | Performed by: NURSE PRACTITIONER

## 2024-04-10 PROCEDURE — 96127 BRIEF EMOTIONAL/BEHAV ASSMT: CPT | Performed by: NURSE PRACTITIONER

## 2024-04-10 PROCEDURE — 99396 PREV VISIT EST AGE 40-64: CPT | Performed by: NURSE PRACTITIONER

## 2024-04-10 PROCEDURE — 77063 BREAST TOMOSYNTHESIS BI: CPT | Performed by: NURSE PRACTITIONER

## 2024-04-10 NOTE — PROGRESS NOTES
Here for Routine Annual Exam  No concerns or questions.  Menses is mostly absent, she has occasional spotting.  Contraception- Miena IUD placed 2020.  She had her mammogram today    ROS: No Cardiac, Respiratory, GI,  or Neurological symptoms.    PE:  GENERAL: well developed, well nourished, in no apparent distress  SKIN: no rashes, no suspicious lesions  HEENT: normal  NECK: supple; no thyroidmegaly, no adenopathy  LUNGS: clear to auscultation  CARDIOVASCULAR: normal S1, S2, RRR  BREASTS: firm, nontendder, no palpable masses or nodes, no nipple discharge, no skin changes, no axillary adenopathy,    ABDOMEN: Soft, non distended; non tender, no masses  GYNE/: External Genitalia: Normal without lesions or erythema                      Vagina: normal without lesions, scant discharge                      Uterus: mid, mobile, non tender, normal size                     Cervix: no lesions or CMT, IUD strings seen                     Adnexa: non tender, no masses, normal size  EXTREMITIES:  non tender without edema    A/P:   1. Well woman exam with routine gynecological exam  Regular self breast exams recommended    2. Cervical cancer screening  - ThinPrep PAP with HPV Reflex Request; Future       Return to clinic 1 year for routine exam, or as needed with any concerns or question

## 2024-04-16 LAB
.: NORMAL
.: NORMAL
HPV I/H RISK 1 DNA SPEC QL NAA+PROBE: NEGATIVE

## 2024-08-04 RX ORDER — AMLODIPINE BESYLATE 5 MG/1
5 TABLET ORAL DAILY
Qty: 30 TABLET | Refills: 5 | Status: SHIPPED | OUTPATIENT
Start: 2024-08-04 | End: 2025-07-30

## 2024-09-18 ENCOUNTER — OFFICE VISIT (OUTPATIENT)
Dept: FAMILY MEDICINE CLINIC | Facility: CLINIC | Age: 49
End: 2024-09-18
Payer: COMMERCIAL

## 2024-09-18 VITALS
BODY MASS INDEX: 28.19 KG/M2 | HEIGHT: 68 IN | RESPIRATION RATE: 16 BRPM | OXYGEN SATURATION: 98 % | WEIGHT: 186 LBS | DIASTOLIC BLOOD PRESSURE: 74 MMHG | HEART RATE: 72 BPM | SYSTOLIC BLOOD PRESSURE: 124 MMHG | TEMPERATURE: 98 F

## 2024-09-18 DIAGNOSIS — R20.2 PARESTHESIA: ICD-10-CM

## 2024-09-18 DIAGNOSIS — N30.00 ACUTE CYSTITIS WITHOUT HEMATURIA: Primary | ICD-10-CM

## 2024-09-18 LAB
BILIRUBIN: NEGATIVE
GLUCOSE (URINE DIPSTICK): NEGATIVE MG/DL
KETONES (URINE DIPSTICK): NEGATIVE MG/DL
MULTISTIX LOT#: ABNORMAL NUMERIC
NITRITE, URINE: NEGATIVE
PH, URINE: 7 (ref 4.5–8)
PROTEIN (URINE DIPSTICK): NEGATIVE MG/DL
SPECIFIC GRAVITY: 1.01 (ref 1–1.03)
UROBILINOGEN,SEMI-QN: 0.2 MG/DL (ref 0–1.9)

## 2024-09-18 PROCEDURE — 99214 OFFICE O/P EST MOD 30 MIN: CPT | Performed by: FAMILY MEDICINE

## 2024-09-18 PROCEDURE — 3074F SYST BP LT 130 MM HG: CPT | Performed by: FAMILY MEDICINE

## 2024-09-18 PROCEDURE — 87077 CULTURE AEROBIC IDENTIFY: CPT | Performed by: FAMILY MEDICINE

## 2024-09-18 PROCEDURE — 87086 URINE CULTURE/COLONY COUNT: CPT | Performed by: FAMILY MEDICINE

## 2024-09-18 PROCEDURE — 81003 URINALYSIS AUTO W/O SCOPE: CPT | Performed by: FAMILY MEDICINE

## 2024-09-18 PROCEDURE — 3008F BODY MASS INDEX DOCD: CPT | Performed by: FAMILY MEDICINE

## 2024-09-18 PROCEDURE — G2211 COMPLEX E/M VISIT ADD ON: HCPCS | Performed by: FAMILY MEDICINE

## 2024-09-18 PROCEDURE — 3078F DIAST BP <80 MM HG: CPT | Performed by: FAMILY MEDICINE

## 2024-09-18 RX ORDER — METHYLPREDNISOLONE 4 MG
TABLET, DOSE PACK ORAL
Qty: 1 EACH | Refills: 0 | Status: SHIPPED | OUTPATIENT
Start: 2024-09-18

## 2024-09-18 RX ORDER — SULFAMETHOXAZOLE/TRIMETHOPRIM 800-160 MG
1 TABLET ORAL 2 TIMES DAILY
Qty: 20 TABLET | Refills: 0 | Status: SHIPPED | OUTPATIENT
Start: 2024-09-18 | End: 2024-09-28

## 2024-09-18 NOTE — PROGRESS NOTES
Subjective:   Patient ID: Jaci Doty is a 49 year old female.    HPI  Ms. Doty is a very pleasant 49-year-old female with history of hypertension, anxiety, lumbar disc herniation here today for tingling in her back and abdominal area since last week.  This tends to originate from her lower back wrapping around her lower abdomen.  She reports that she tends to go to the bathroom and a bit more frequently to urinate.  She noticed some sediments in her urine but no blood.  She had 1 episode in which she had fever and took leftover Augmentin from her mom's.  Since that she has not had any fever.  She previously had weight loss surgery and has been steadily losing weight.  She is off the semaglutide.  Otherwise no nausea no vomiting no abdominal pain no diarrhea no constipation.  She spoke to her weight loss surgeon and had CT scan of the abdomen pelvis which showed adnexal cyst but otherwise unremarkable findings. I had reviewed past medical and family histories together with allergy and medication lists documented.    History/Other:   Review of Systems   Constitutional:  Positive for fever. Negative for activity change, appetite change, chills, fatigue and unexpected weight change.   HENT:  Negative for sore throat and trouble swallowing.    Respiratory:  Negative for cough and shortness of breath.    Cardiovascular:  Negative for chest pain and palpitations.   Gastrointestinal:  Negative for abdominal pain, blood in stool, constipation, diarrhea, nausea and vomiting.   Genitourinary:  Negative for difficulty urinating, dysuria, frequency and urgency.   Musculoskeletal:  Negative for back pain, joint swelling and myalgias.   Neurological:  Negative for weakness, numbness and headaches.     Current Outpatient Medications   Medication Sig Dispense Refill    sulfamethoxazole-trimethoprim -160 MG Oral Tab per tablet Take 1 tablet by mouth 2 (two) times daily for 10 days. 20 tablet 0    methylPREDNISolone  (MEDROL) 4 MG Oral Tablet Therapy Pack As directed. 1 each 0    AMLODIPINE 5 MG Oral Tab TAKE 1 TABLET (5 MG TOTAL) BY MOUTH DAILY. 30 tablet 5    citalopram 10 MG Oral Tab Take 1 tablet (10 mg total) by mouth daily. 30 tablet 1    fluticasone propionate 50 MCG/ACT Nasal Suspension 2 sprays by Nasal route daily. 16 mL 0     Allergies:No Known Allergies    Objective:   Physical Exam  Vitals reviewed.   Constitutional:       General: She is not in acute distress.  HENT:      Mouth/Throat:      Mouth: Mucous membranes are moist.      Pharynx: Oropharynx is clear.   Eyes:      General: No scleral icterus.     Conjunctiva/sclera: Conjunctivae normal.   Cardiovascular:      Rate and Rhythm: Normal rate and regular rhythm.      Heart sounds: Normal heart sounds. No murmur heard.  Pulmonary:      Effort: Pulmonary effort is normal. No respiratory distress.      Breath sounds: Normal breath sounds. No wheezing or rales.   Abdominal:      General: Bowel sounds are normal. There is no distension.      Palpations: Abdomen is soft. There is no mass.      Tenderness: There is no abdominal tenderness. There is no right CVA tenderness, left CVA tenderness, guarding or rebound.      Hernia: No hernia is present.   Musculoskeletal:      Cervical back: Neck supple.      Right lower leg: No edema.      Left lower leg: No edema.   Lymphadenopathy:      Cervical: No cervical adenopathy.   Skin:     General: Skin is warm.   Neurological:      General: No focal deficit present.      Mental Status: She is alert.   Psychiatric:         Mood and Affect: Mood normal.         Behavior: Behavior normal.         Assessment & Plan:   1. Acute cystitis without hematuria   -UA done in office shows trace leukocytes and small blood  - Will proceed with treating with Bactrim DS 1 tablet twice a day for 10 days  - Keep hydrated  - May take Tylenol as needed for fever or pain  - Will submit for urine culture and await results   2. Paresthesia   -Unclear  if this is related to #1  Perhaps this is neurogenic in etiology  - Trial of Medrol Dosepak  - If with no improvement we may need to refer her to neurology   Call or come in sooner if symptoms worsen or persist    This note was prepared using Dragon Medical voice recognition dictation software. As a result errors may occur. When identified these errors have been corrected. While every attempt is made to correct errors during dictation discrepancies may still exist            Orders Placed This Encounter   Procedures    URINALYSIS, AUTO, W/O SCOPE    Urine Culture, Routine [E]       Meds This Visit:  Requested Prescriptions     Signed Prescriptions Disp Refills    sulfamethoxazole-trimethoprim -160 MG Oral Tab per tablet 20 tablet 0     Sig: Take 1 tablet by mouth 2 (two) times daily for 10 days.    methylPREDNISolone (MEDROL) 4 MG Oral Tablet Therapy Pack 1 each 0     Sig: As directed.       Imaging & Referrals:  None

## 2024-09-18 NOTE — PATIENT INSTRUCTIONS
Thank you for choosing Fausto Hooper MD at [facility]  To Do: Jaci Doty  1. Please take meds as directed.     Call [phone] to schedule the appointment.   Please signup for LLLer, which is electronic access to your record if you have not done so.  All your results will post on there.  [email or web address]   You can NOW use LLLer to book your appointments with us, or consider using open access scheduling which is through the [place] website [email or web address] and type in Fausto Hooper MD and follow the links for \"Schedule Online Now\"    To schedule Imaging or tests at [place] call Central Scheduling [phone], Go to [facility] ER Building (For example: CT scans, X rays, Ultrasound, MRI)  Cardiac Testing in ER building Building A second floor Cardiac Testing [phone] (For example: Holter Monitor, Cardiac Stress tests,Event Monitor, or 2D Echocardiograms)  [facility] call [phone] usually in [facility]  Walk in Clinic in [place] at [address] Mon-Fri at 8am-7:30 p.m., and Sat/Sun 9:00a.m.-4:30 p.m.  Also at [address] [place] [address]  Call [phone] for info     Please call our office about any questions regarding your treatment/medicines/tests as a result of today's visit.  For your safety, read the entire package insert of all medicines prescribed to you and be aware of all of the risks of treatment even beyond those discussed today.  All therapies have potential risk of harm or side effects or medication interactions.  It is your duty and for your safety to discuss with the pharmacist and our office with questions, and to notify us and stop treatment if problems arise, but know that our intention is that the benefits outweigh those potential risks and we strive to make you healthier and to improve your quality of life.    Referrals, and Radiology Information:    If your insurance requires a referral to a specialist, please allow 5 business days  to process your referral request.    If Fausto Hooper MD orders a CT or MRI, it may take up to 10 business days to receive approval from your insurance company. Once our office has called informing you that the insurance company approved your testing, please call Central Scheduling at [phone]  Please allow our office 5 business days to contact you regarding any testing results.    Refill policies:   Allow 3 business days for refills; controlled substances may take longer and must be picked up from the office in person.  Narcotic medications can only be filled in 30 day increments and must be refilled at an office visit only.  If your prescription is due for a refill, you may be due for a follow-up appointment.  We cannot refill your maintenance medications at a preventative wellness visit.  To best provide you care, patients receiving maintenance medications need to be seen at least twice a year.

## 2024-09-23 ENCOUNTER — PATIENT MESSAGE (OUTPATIENT)
Dept: FAMILY MEDICINE CLINIC | Facility: CLINIC | Age: 49
End: 2024-09-23

## 2024-09-23 ENCOUNTER — TELEPHONE (OUTPATIENT)
Dept: FAMILY MEDICINE CLINIC | Facility: CLINIC | Age: 49
End: 2024-09-23

## 2024-09-23 DIAGNOSIS — N83.209 CYST OF OVARY, UNSPECIFIED LATERALITY: ICD-10-CM

## 2024-09-23 DIAGNOSIS — N30.00 ACUTE CYSTITIS WITHOUT HEMATURIA: ICD-10-CM

## 2024-09-23 DIAGNOSIS — R10.9 ABDOMINAL PAIN, UNSPECIFIED ABDOMINAL LOCATION: Primary | ICD-10-CM

## 2024-09-23 NOTE — TELEPHONE ENCOUNTER
I would like to look further into ovarian cysts that were seen in CT scan.  Please do a pelvic ultrasound rather than abdominal ultrasound.  Let us also recheck a urinalysis and urine culture to see if bacteria had cleared up.

## 2024-09-23 NOTE — TELEPHONE ENCOUNTER
Patient called and said she can't get in until mid October for her ultrasound.  She said radiology told her if it was ordered as a STAT they would be able to get her in sooner.  She would like it to be ordered as a STAT.

## 2024-09-23 NOTE — TELEPHONE ENCOUNTER
From: Jaci Doty  To: Fausto Hooper  Sent: 9/23/2024 2:32 PM CDT  Subject: Current Antibiotic    I was just wondering if you think the antibiotic I'm taking is strong enough for this infection/ UTI that I have? Also, I have only 4 days left of it. Should we do another urnine sample when I'm done to see if it cleared up? The pain has not gone away. I have the Ultra sound scheduled for tomorrow 9.24 @ 2:00.

## 2024-09-23 NOTE — TELEPHONE ENCOUNTER
From: Jaci Doty  To: Fausto Hooper  Sent: 9/23/2024 9:08 AM CDT  Subject: Urine results / meds    Hello. I Received the results for Urine analysis.. I’m 5 1/2 days into my antibiotics and I’m still not feeling better. My lower back and my gut/ waist line are still very tender. Is that normal? Is this more severe than what we think? Do you think an ultra sound would show something? I will continue taking my antibiotics. The steroids are done as of today. Im struggling with what to do next.

## 2024-09-23 NOTE — TELEPHONE ENCOUNTER
Final urine culture shows Klebsiella pneumoniae.  Antibiotic should be effective for this bacteria.   Written by Fausto Hooper MD on 9/22/2024  3:56 PM CDT  Seen by patient Jaci Doty on 9/22/2024  4:25 PM    See True [employer] message:  Hello.  I Received the results for Urine analysis..  I’m 5 1/2 days into my antibiotics and I’m still not feeling better.  My lower back and my gut/ waist line are still very tender.  Is that normal?  Is this more severe than what we think?  Do you think an ultra sound would show something?  I will continue taking my antibiotics.  The steroids are done as of today.  Im struggling with what to do next.

## 2024-09-23 NOTE — TELEPHONE ENCOUNTER
From: Jaci Doty  To: Fausto Hooper  Sent: 9/23/2024 12:10 PM CDT  Subject: CT scan results prior to my last appt with you.     Hello, Here are CT scan results that you asked last week to upload. See attachment

## 2024-09-23 NOTE — TELEPHONE ENCOUNTER
Here are the patient's CT abdomen and pelvis results from 9/18/2024 from [facility]. Ready for review.    Patient also has 2 other patient messages from today.     Please advise on patient's concerns about antibiotic and retesting.

## 2024-09-24 ENCOUNTER — HOSPITAL ENCOUNTER (OUTPATIENT)
Dept: ULTRASOUND IMAGING | Facility: HOSPITAL | Age: 49
Discharge: HOME | End: 2024-09-24
Attending: FAMILY MEDICINE
Payer: COMMERCIAL

## 2024-09-24 DIAGNOSIS — K76.0 FATTY LIVER: ICD-10-CM

## 2024-09-24 DIAGNOSIS — K82.8 GALLBLADDER SLUDGE: ICD-10-CM

## 2024-09-24 DIAGNOSIS — R10.9 ABDOMINAL PAIN, UNSPECIFIED ABDOMINAL LOCATION: ICD-10-CM

## 2024-09-24 DIAGNOSIS — R10.9 ABDOMINAL PAIN, UNSPECIFIED ABDOMINAL LOCATION: Primary | ICD-10-CM

## 2024-09-24 PROCEDURE — 76700 US EXAM ABDOM COMPLETE: CPT | Performed by: FAMILY MEDICINE

## 2024-09-29 LAB
APPEARANCE: CLEAR
BILIRUBIN: NEGATIVE
COLOR: YELLOW
GLUCOSE: NEGATIVE
KETONES: NEGATIVE
LEUKOCYTE ESTERASE: NEGATIVE
NITRITE: NEGATIVE
OCCULT BLOOD: NEGATIVE
PROTEIN: NEGATIVE
SPECIFIC GRAVITY: 1.01 (ref 1–1.03)

## 2024-10-24 RX ORDER — FLUTICASONE PROPIONATE 50 MCG
2 SPRAY, SUSPENSION (ML) NASAL DAILY
Qty: 16 ML | Refills: 0 | Status: SHIPPED | OUTPATIENT
Start: 2024-10-24

## 2024-12-30 ENCOUNTER — TELEMEDICINE (OUTPATIENT)
Dept: FAMILY MEDICINE CLINIC | Facility: CLINIC | Age: 49
End: 2024-12-30
Payer: COMMERCIAL

## 2024-12-30 DIAGNOSIS — J01.10 ACUTE NON-RECURRENT FRONTAL SINUSITIS: Primary | ICD-10-CM

## 2024-12-30 RX ORDER — AMOXICILLIN 500 MG/1
500 CAPSULE ORAL 2 TIMES DAILY
Qty: 20 CAPSULE | Refills: 0 | Status: SHIPPED | OUTPATIENT
Start: 2024-12-30 | End: 2025-01-09

## 2024-12-30 NOTE — PROGRESS NOTES
Subjective:   Patient ID: Jaci Doty is a 49 year old female.    HPI  Ms. Doty is a very pleasant 49-year-old female with known history of hypertension, anxiety presenting for video visit today for sinus pain and congestion for the past 3 days associated with fever, cough cough which is slightly productive of phlegm.  She does feel a bit tired.  She has been taking Tylenol and ibuprofen for the past 3 days but with no improvement.  She tested negative for COVID-19.  No sick contacts that she reports.    I had reviewed past medical and family histories together with allergy and medication lists documented.    History/Other:   Review of Systems   HENT:  Negative for trouble swallowing.    Gastrointestinal:  Negative for abdominal pain, diarrhea, nausea and vomiting.     Current Outpatient Medications   Medication Sig Dispense Refill    amoxicillin 500 MG Oral Cap Take 1 capsule (500 mg total) by mouth 2 (two) times daily for 10 days. 20 capsule 0    FLUTICASONE PROPIONATE 50 MCG/ACT Nasal Suspension SPRAY 2 SPRAYS INTO EACH NOSTRIL EVERY DAY 16 mL 0    methylPREDNISolone (MEDROL) 4 MG Oral Tablet Therapy Pack As directed. 1 each 0    AMLODIPINE 5 MG Oral Tab TAKE 1 TABLET (5 MG TOTAL) BY MOUTH DAILY. 30 tablet 5    citalopram 10 MG Oral Tab Take 1 tablet (10 mg total) by mouth daily. 30 tablet 1     Allergies:Allergies[1]    Objective:   Physical Exam  Constitutional:       General: She is not in acute distress.  Neurological:      Mental Status: She is alert.   Psychiatric:         Mood and Affect: Mood normal.         Assessment & Plan:   1. Acute non-recurrent frontal sinusitis    -Keep hydrated  - Continue with Tylenol and/or ibuprofen as needed for fever or pain  - Go to the emergency room if with respiratory distress and/or severe dehydration  - We will treat with amoxicillin which was sent to her pharmacy  - Call or come in sooner if symptoms worsen or persist      This note was prepared using Dragon  Medical voice recognition dictation software. As a result errors may occur. When identified these errors have been corrected. While every attempt is made to correct errors during dictation discrepancies may still exist            No orders of the defined types were placed in this encounter.      Meds This Visit:  Requested Prescriptions     Signed Prescriptions Disp Refills    amoxicillin 500 MG Oral Cap 20 capsule 0     Sig: Take 1 capsule (500 mg total) by mouth 2 (two) times daily for 10 days.       Imaging & Referrals:  None         [1] No Known Allergies

## 2025-05-19 RX ORDER — AMLODIPINE BESYLATE 5 MG/1
5 TABLET ORAL DAILY
Qty: 30 TABLET | Refills: 5 | Status: SHIPPED | OUTPATIENT
Start: 2025-05-19

## 2025-05-19 NOTE — TELEPHONE ENCOUNTER
Requesting Amlodipine 5mg  LOV: 12/30/24 Telemedicine  RTC: prn  Last Relevant Labs: 9/16/24  Filled: 8/4/24 #30 with 5 refills    No future appointments.    Hypertension Medications Protocol [id number]/19/2025 09:00 AM   Protocol Details   CMP or BMP in past 12 months    EGFRCR or GFRNAA > 50    Last BP reading less than 140/90    In person appointment or virtual visit in the past 12 mos or appointment in next 3 mos    Medication is active on med list     Rx sent to pharmacy per protocol

## (undated) NOTE — MR AVS SNAPSHOT
[facility] [address]  [place] [employer]  [phone]               Thank you for choosing us for your health care visit with Erin Uriostegui PA-C.   We are glad to serve you and happy to provide you •[facility] call [phone] usually in [address] [facility] in Clinic in [place] at [facility].  [address] Mon-Fri at 8am-7:30pm, and Sat/Sun 9am-430pm  Also at [address]  Call [phone] for info    • Please call our office about to be seen at least twice a year. .         Follow Up with Our Office     Return in about 4 weeks (around 2/10/2017).       Allergies as of Jan 13, 2017     No Known Allergies                Today's Vital Signs     BP Pulse Height Weight BMI    138/84 mmHg 7 [facility] Central Scheduling   at [phone]. [employer]     Visit listedplaces  You can access your MyChart to more actively manage your health care and view more details from this visit by going to [email or web address].   If you've recentl

## (undated) NOTE — LETTER
Claudetta Litter, [facility]:[id number]    CONSENT FOR PROCEDURE/SEDATION    1. I authorize the performance upon Claudetta Litter  the following: Intrauterine Device Mirena Insert    2.  I authorize SHAR Castro (and whomever is designated as the doctor’s as Relationship to patient: ____________________________________________    Witness: _________________________________________ Date:___________     Physician Signature: _______________________________ Date:___________